# Patient Record
Sex: MALE | Race: WHITE | NOT HISPANIC OR LATINO | Employment: FULL TIME | URBAN - NONMETROPOLITAN AREA
[De-identification: names, ages, dates, MRNs, and addresses within clinical notes are randomized per-mention and may not be internally consistent; named-entity substitution may affect disease eponyms.]

---
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
## 2022-08-04 ENCOUNTER — APPOINTMENT (OUTPATIENT)
Dept: CT IMAGING | Facility: HOSPITAL | Age: 22
End: 2022-08-04
Attending: PHYSICIAN ASSISTANT
Payer: COMMERCIAL

## 2022-08-04 ENCOUNTER — HOSPITAL ENCOUNTER (EMERGENCY)
Facility: HOSPITAL | Age: 22
Discharge: ANOTHER HOSPITAL | End: 2022-08-04
Attending: PHYSICIAN ASSISTANT | Admitting: PHYSICIAN ASSISTANT
Payer: COMMERCIAL

## 2022-08-04 VITALS
DIASTOLIC BLOOD PRESSURE: 113 MMHG | TEMPERATURE: 99.2 F | RESPIRATION RATE: 18 BRPM | HEART RATE: 115 BPM | WEIGHT: 139.4 LBS | OXYGEN SATURATION: 100 % | SYSTOLIC BLOOD PRESSURE: 163 MMHG

## 2022-08-04 DIAGNOSIS — E87.29 HIGH ANION GAP METABOLIC ACIDOSIS: ICD-10-CM

## 2022-08-04 DIAGNOSIS — E83.39 HYPERPHOSPHATEMIA: ICD-10-CM

## 2022-08-04 DIAGNOSIS — D64.9 ANEMIA, UNSPECIFIED TYPE: ICD-10-CM

## 2022-08-04 DIAGNOSIS — E87.6 HYPOKALEMIA: ICD-10-CM

## 2022-08-04 DIAGNOSIS — D64.9 ANEMIA: ICD-10-CM

## 2022-08-04 DIAGNOSIS — Q61.3 POLYCYSTIC KIDNEY DISEASE: ICD-10-CM

## 2022-08-04 DIAGNOSIS — N19 RENAL FAILURE: ICD-10-CM

## 2022-08-04 LAB
ABO/RH(D): NORMAL
ALBUMIN SERPL-MCNC: 4 G/DL (ref 3.4–5)
ALBUMIN UR-MCNC: 100 MG/DL
ALP SERPL-CCNC: 97 U/L (ref 65–260)
ALT SERPL W P-5'-P-CCNC: 6 U/L (ref 0–50)
ANION GAP SERPL CALCULATED.3IONS-SCNC: 23 MMOL/L (ref 3–14)
ANTIBODY SCREEN: NEGATIVE
APPEARANCE UR: CLEAR
AST SERPL W P-5'-P-CCNC: <3 U/L (ref 0–35)
BASOPHILS # BLD AUTO: 0.1 10E3/UL (ref 0–0.2)
BASOPHILS NFR BLD AUTO: 0 %
BILIRUB SERPL-MCNC: 0.4 MG/DL (ref 0.2–1.3)
BILIRUB UR QL STRIP: NEGATIVE
BUN SERPL-MCNC: 139 MG/DL (ref 7–30)
CALCIUM SERPL-MCNC: 9 MG/DL (ref 8.5–10.1)
CHLORIDE BLD-SCNC: 97 MMOL/L (ref 98–110)
CO2 SERPL-SCNC: 16 MMOL/L (ref 20–32)
COLOR UR AUTO: ABNORMAL
CREAT SERPL-MCNC: 18.7 MG/DL (ref 0.5–1)
CRP SERPL-MCNC: 36.6 MG/L (ref 0–8)
EOSINOPHIL # BLD AUTO: 0 10E3/UL (ref 0–0.7)
EOSINOPHIL NFR BLD AUTO: 0 %
ERYTHROCYTE [DISTWIDTH] IN BLOOD BY AUTOMATED COUNT: 20.2 % (ref 10–15)
GFR SERPL CREATININE-BSD FRML MDRD: 3 ML/MIN/1.73M2
GLUCOSE BLD-MCNC: 261 MG/DL (ref 70–99)
GLUCOSE UR STRIP-MCNC: NEGATIVE MG/DL
HCT VFR BLD AUTO: 23.3 % (ref 40–53)
HGB BLD-MCNC: 6.9 G/DL (ref 13.3–17.7)
HGB UR QL STRIP: ABNORMAL
IMM GRANULOCYTES # BLD: 0 10E3/UL
IMM GRANULOCYTES NFR BLD: 0 %
INR PPP: 1.23 (ref 0.85–1.15)
KETONES UR STRIP-MCNC: NEGATIVE MG/DL
LACTATE SERPL-SCNC: 2.1 MMOL/L (ref 0.7–2)
LEUKOCYTE ESTERASE UR QL STRIP: ABNORMAL
LIPASE SERPL-CCNC: 251 U/L (ref 73–393)
LYMPHOCYTES # BLD AUTO: 0.8 10E3/UL (ref 0.8–5.3)
LYMPHOCYTES NFR BLD AUTO: 5 %
MAGNESIUM SERPL-MCNC: 2.3 MG/DL (ref 1.6–2.3)
MCH RBC QN AUTO: 17.9 PG (ref 26.5–33)
MCHC RBC AUTO-ENTMCNC: 29.6 G/DL (ref 31.5–36.5)
MCV RBC AUTO: 61 FL (ref 78–100)
MONOCYTES # BLD AUTO: 0.8 10E3/UL (ref 0–1.3)
MONOCYTES NFR BLD AUTO: 5 %
NEUTROPHILS # BLD AUTO: 12.2 10E3/UL (ref 1.6–8.3)
NEUTROPHILS NFR BLD AUTO: 90 %
NITRATE UR QL: NEGATIVE
NRBC # BLD AUTO: 0 10E3/UL
NRBC BLD AUTO-RTO: 0 /100
PH UR STRIP: 6 [PH] (ref 4.7–8)
PHOSPHATE SERPL-MCNC: 11.2 MG/DL (ref 2.5–4.5)
PLATELET # BLD AUTO: 458 10E3/UL (ref 150–450)
POTASSIUM BLD-SCNC: 2.5 MMOL/L (ref 3.4–5.3)
PROT SERPL-MCNC: 8.9 G/DL (ref 6.8–8.8)
RBC # BLD AUTO: 3.85 10E6/UL (ref 4.4–5.9)
RBC URINE: 17 /HPF
SARS-COV-2 RNA RESP QL NAA+PROBE: NEGATIVE
SODIUM SERPL-SCNC: 136 MMOL/L (ref 133–144)
SP GR UR STRIP: 1.01 (ref 1–1.03)
SPECIMEN EXPIRATION DATE: NORMAL
SQUAMOUS EPITHELIAL: 0 /HPF
TSH SERPL DL<=0.005 MIU/L-ACNC: 1.01 MU/L (ref 0.4–4)
UROBILINOGEN UR STRIP-MCNC: NORMAL MG/DL
WBC # BLD AUTO: 13.8 10E3/UL (ref 4–11)
WBC URINE: 7 /HPF

## 2022-08-04 PROCEDURE — 84100 ASSAY OF PHOSPHORUS: CPT | Performed by: PHYSICIAN ASSISTANT

## 2022-08-04 PROCEDURE — 93010 ELECTROCARDIOGRAM REPORT: CPT | Performed by: INTERNAL MEDICINE

## 2022-08-04 PROCEDURE — 81001 URINALYSIS AUTO W/SCOPE: CPT | Performed by: PHYSICIAN ASSISTANT

## 2022-08-04 PROCEDURE — 83735 ASSAY OF MAGNESIUM: CPT | Performed by: PHYSICIAN ASSISTANT

## 2022-08-04 PROCEDURE — 86850 RBC ANTIBODY SCREEN: CPT | Performed by: PHYSICIAN ASSISTANT

## 2022-08-04 PROCEDURE — 99285 EMERGENCY DEPT VISIT HI MDM: CPT | Mod: 25

## 2022-08-04 PROCEDURE — U0005 INFEC AGEN DETEC AMPLI PROBE: HCPCS | Performed by: PHYSICIAN ASSISTANT

## 2022-08-04 PROCEDURE — 84443 ASSAY THYROID STIM HORMONE: CPT | Performed by: PHYSICIAN ASSISTANT

## 2022-08-04 PROCEDURE — 83605 ASSAY OF LACTIC ACID: CPT | Performed by: PHYSICIAN ASSISTANT

## 2022-08-04 PROCEDURE — C9803 HOPD COVID-19 SPEC COLLECT: HCPCS

## 2022-08-04 PROCEDURE — 82040 ASSAY OF SERUM ALBUMIN: CPT | Performed by: PHYSICIAN ASSISTANT

## 2022-08-04 PROCEDURE — 87086 URINE CULTURE/COLONY COUNT: CPT | Performed by: PHYSICIAN ASSISTANT

## 2022-08-04 PROCEDURE — 85004 AUTOMATED DIFF WBC COUNT: CPT | Performed by: PHYSICIAN ASSISTANT

## 2022-08-04 PROCEDURE — 80053 COMPREHEN METABOLIC PANEL: CPT | Performed by: PHYSICIAN ASSISTANT

## 2022-08-04 PROCEDURE — 86901 BLOOD TYPING SEROLOGIC RH(D): CPT | Performed by: PHYSICIAN ASSISTANT

## 2022-08-04 PROCEDURE — 96365 THER/PROPH/DIAG IV INF INIT: CPT

## 2022-08-04 PROCEDURE — 36415 COLL VENOUS BLD VENIPUNCTURE: CPT | Performed by: PHYSICIAN ASSISTANT

## 2022-08-04 PROCEDURE — 85610 PROTHROMBIN TIME: CPT | Performed by: PHYSICIAN ASSISTANT

## 2022-08-04 PROCEDURE — 86140 C-REACTIVE PROTEIN: CPT | Performed by: PHYSICIAN ASSISTANT

## 2022-08-04 PROCEDURE — 250N000013 HC RX MED GY IP 250 OP 250 PS 637: Performed by: PHYSICIAN ASSISTANT

## 2022-08-04 PROCEDURE — 93005 ELECTROCARDIOGRAM TRACING: CPT

## 2022-08-04 PROCEDURE — 71250 CT THORAX DX C-: CPT

## 2022-08-04 PROCEDURE — 83690 ASSAY OF LIPASE: CPT | Performed by: PHYSICIAN ASSISTANT

## 2022-08-04 PROCEDURE — 99284 EMERGENCY DEPT VISIT MOD MDM: CPT | Performed by: PHYSICIAN ASSISTANT

## 2022-08-04 PROCEDURE — 250N000011 HC RX IP 250 OP 636: Performed by: PHYSICIAN ASSISTANT

## 2022-08-04 RX ORDER — POTASSIUM CHLORIDE 7.45 MG/ML
10 INJECTION INTRAVENOUS CONTINUOUS
Status: DISCONTINUED | OUTPATIENT
Start: 2022-08-04 | End: 2022-08-04 | Stop reason: HOSPADM

## 2022-08-04 RX ORDER — POTASSIUM CHLORIDE 20MEQ/15ML
40 LIQUID (ML) ORAL ONCE
Status: COMPLETED | OUTPATIENT
Start: 2022-08-04 | End: 2022-08-04

## 2022-08-04 RX ORDER — IOPAMIDOL 755 MG/ML
70 INJECTION, SOLUTION INTRAVASCULAR ONCE
Status: DISCONTINUED | OUTPATIENT
Start: 2022-08-04 | End: 2022-08-04

## 2022-08-04 RX ADMIN — POTASSIUM CHLORIDE 10 MEQ: 7.46 INJECTION, SOLUTION INTRAVENOUS at 18:02

## 2022-08-04 RX ADMIN — POTASSIUM CHLORIDE 40 MEQ: 20 SOLUTION ORAL at 16:21

## 2022-08-04 NOTE — ED TRIAGE NOTES
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 He is grimacing at times and is walking as if he is bow-legged which Mom states is not normal for him. He appears pale to this writer. Hypertension noted. Mom states his last weigh in was 185 lb and today he is 139.4 lb. He has not been eating well according to Mom and he just doesn't seem quite right. Primary Doctoring is at [facility].      Triage Assessment     Row Name 08/04/22 3857       Triage Assessment (Adult)    Airway WDL WDL       Respiratory WDL    Respiratory WDL WDL       Skin Circulation/Temperature WDL    Skin Circulation/Temperature WDL WDL       Cardiac WDL    Cardiac WDL WDL       Peripheral/Neurovascular WDL    Peripheral Neurovascular WDL WDL       Cognitive/Neuro/Behavioral WDL    Cognitive/Neuro/Behavioral WDL X;all  normal for this patient              "

## 2022-08-04 NOTE — DISCHARGE INSTRUCTIONS

## 2022-08-04 NOTE — ED NOTES
Urine sample cup provided. Pt and mom aware we would like a urine sample. Pt denies need to urinate at this time.

## 2022-08-04 NOTE — ED PROVIDER NOTES
"  History     Chief Complaint   Patient presents with     Leg Pain     Patient states \"legs hurt\"     The history is provided by a parent.     Robert Alarcon is a 21 year old male who presented to the emergency department ambulatory along with mother for evaluation of a multitude of mother's concerns.  The patient is on the autism spectrum.  He offers no significant or appreciable HPI.  From what I can gather the patient has been complaining of bilateral leg discomfort and mother reports that his gait has \"changed.\"  He is also lost approximately 40 pounds over the last several months.  Unfortunately has no primary care.    Allergies:  No Known Allergies    Problem List:    There are no problems to display for this patient.       Past Medical History:    No past medical history on file.    Past Surgical History:    No past surgical history on file.    Family History:    No family history on file.    Social History:  Marital Status:  Single [1]        Medications:    No current outpatient medications on file.        Review of Systems   Unable to perform ROS: Patient nonverbal       Physical Exam   BP: (!) 186/126  Pulse: (!) 124  Temp: 99.2  F (37.3  C)  Resp: 20  Weight: 63.2 kg (139 lb 6.4 oz)  SpO2: 100 %      Physical Exam  Vitals and nursing note reviewed.   Constitutional:       Comments: This young man appears chronically ill but nontoxic.   Cardiovascular:      Rate and Rhythm: Regular rhythm.      Comments: Mild tachycardia  Pulmonary:      Effort: Pulmonary effort is normal.      Breath sounds: Normal breath sounds.   Abdominal:      Comments: Palpation of the abdomen appears to have multiple masses.   Musculoskeletal:      Comments: Examination of the bilateral legs reveals no edema, erythema, or evidence of tenderness.   Skin:     General: Skin is warm and dry.      Capillary Refill: Capillary refill takes less than 2 seconds.   Neurological:      General: No focal deficit present.      Mental Status: He " is alert. Mental status is at baseline.      Comments: At baseline per mother         ED Course              ED Course as of 08/04/22 1941 Thu Aug 04, 2022   1758 Discussed with Dr. Salas at [facility] in [facility].  Plan will be for weightlifting the patient and potassium repletion with eventual bicarbonate replacement for the metabolic acidosis.   1758 We are calling around to tertiary centers for possible admission.     Procedures              Critical Care time:  none              Results for orders placed or performed during the hospital encounter of 08/04/22 (from the past 24 hour(s))   CBC with platelets differential    Narrative    The following orders were created for panel order CBC with platelets differential.  Procedure                               Abnormality         Status                     ---------                               -----------         ------                     CBC with platelets and d...[[id number]]  Abnormal            Final result                 Please view results for these tests on the individual orders.   INR   Result Value Ref Range    INR 1.23 (H) 0.85 - 1.15   Comprehensive metabolic panel   Result Value Ref Range    Sodium 136 133 - 144 mmol/L    Potassium 2.5 (LL) 3.4 - 5.3 mmol/L    Chloride 97 (L) 98 - 110 mmol/L    Carbon Dioxide (CO2) 16 (L) 20 - 32 mmol/L    Anion Gap 23 (H) 3 - 14 mmol/L    Urea Nitrogen 139 (H) 7 - 30 mg/dL    Creatinine 18.70 (H) 0.50 - 1.00 mg/dL    Calcium 9.0 8.5 - 10.1 mg/dL    Glucose 261 (H) 70 - 99 mg/dL    Alkaline Phosphatase 97 65 - 260 U/L    AST <3 0 - 35 U/L    ALT 6 0 - 50 U/L    Protein Total 8.9 (H) 6.8 - 8.8 g/dL    Albumin 4.0 3.4 - 5.0 g/dL    Bilirubin Total 0.4 0.2 - 1.3 mg/dL    GFR Estimate 3 (L) >60 mL/min/1.73m2   Lipase   Result Value Ref Range    Lipase 251 73 - 393 U/L   Lactic acid whole blood   Result Value Ref Range    Lactic Acid 2.1 (H) 0.7 - 2.0 mmol/L   Magnesium   Result Value Ref Range     Magnesium 2.3 1.6 - 2.3 mg/dL   TSH with free T4 reflex   Result Value Ref Range    TSH 1.01 0.40 - 4.00 mU/L   CRP inflammation   Result Value Ref Range    CRP Inflammation 36.6 (H) 0.0 - 8.0 mg/L   CBC with platelets and differential   Result Value Ref Range    WBC Count 13.8 (H) 4.0 - 11.0 10e3/uL    RBC Count 3.85 (L) 4.40 - 5.90 10e6/uL    Hemoglobin 6.9 (LL) 13.3 - 17.7 g/dL    Hematocrit 23.3 (L) 40.0 - 53.0 %    MCV 61 (L) 78 - 100 fL    MCH 17.9 (L) 26.5 - 33.0 pg    MCHC 29.6 (L) 31.5 - 36.5 g/dL    RDW 20.2 (H) 10.0 - 15.0 %    Platelet Count 458 (H) 150 - 450 10e3/uL    % Neutrophils 90 %    % Lymphocytes 5 %    % Monocytes 5 %    % Eosinophils 0 %    % Basophils 0 %    % Immature Granulocytes 0 %    NRBCs per 100 WBC 0 <1 /100    Absolute Neutrophils 12.2 (H) 1.6 - 8.3 10e3/uL    Absolute Lymphocytes 0.8 0.8 - 5.3 10e3/uL    Absolute Monocytes 0.8 0.0 - 1.3 10e3/uL    Absolute Eosinophils 0.0 0.0 - 0.7 10e3/uL    Absolute Basophils 0.1 0.0 - 0.2 10e3/uL    Absolute Immature Granulocytes 0.0 <=0.4 10e3/uL    Absolute NRBCs 0.0 10e3/uL   Phosphorus   Result Value Ref Range    Phosphorus 11.2 (H) 2.5 - 4.5 mg/dL   ABO/Rh type and screen *Canceled*    Narrative    The following orders were created for panel order ABO/Rh type and screen.  Procedure                               Abnormality         Status                     ---------                               -----------         ------                       Please view results for these tests on the individual orders.   CT Chest Abdomen Pelvis w/o Contrast    Narrative    PROCEDURE:  CT CHEST ABDOMEN PELVIS W/O CONTRAST.      HISTORY:  Profound weight loss, renal failure, anemia.      TECHNIQUE:  Helical CT of the chest, abdomen and pelvis was performed  without intravenous contrast.    COMPARISON:  8/22/2019.    FINDINGS:      The left ventricle is dilated. The right ventricle is hypoplastic.  There is no pericardial or pleural effusion. The thoracic  aorta and  main pulmonary artery are within normal limits in size. No abnormal  thoracic adenopathy is identified.    The central airways are patent. There is no focal consolidation or  significant atelectasis.  No pulmonary mass is identified.    The kidneys are enlarged secondary to advanced polycystic kidney  disease. Assessment for a suspicious renal cortical mass is limited  absent postcontrast sequences. There is some associated distortion of  the small bowel in the upper abdomen, which remains normal in caliber.  The appendix is normal.    No free fluid, free air or adenopathy is present. No suspicious  osseous lesions are identified.      Impression    IMPRESSION:      Dilated left ventricle. Hypoplastic right ventricle. Correlate with  any cardiology history. Consider echocardiogram.    Severe polycystic kidney disease with progressive intra-abdominal mass  effect.    KARLENE PLASCENCIA MD         SYSTEM ID:  [id number]   ABO/Rh type and screen    Narrative    The following orders were created for panel order ABO/Rh type and screen.  Procedure                               Abnormality         Status                     ---------                               -----------         ------                     Adult Type and Screen[[id number]]                            Edited Result - FINAL        Please view results for these tests on the individual orders.   Adult Type and Screen   Result Value Ref Range    ABO/RH(D) AB POS     Antibody Screen Negative Negative    SPECIMEN EXPIRATION DATE [id number]    Asymptomatic COVID-19 Virus (Coronavirus) by PCR Nasopharyngeal    Specimen: Nasopharyngeal; Swab   Result Value Ref Range    SARS CoV2 PCR Negative Negative    Narrative    Testing was performed using the Xpert Xpress SARS-CoV-2 Assay on the   Cepheid Gene-Xpert Instrument Systems. Additional information about   this Emergency Use Authorization (EUA) assay can be found via the Lab   Guide. This test should be  ordered for the detection of SARS-CoV-2 in   individuals who meet SARS-CoV-2 clinical and/or epidemiological   criteria. Test performance is unknown in asymptomatic patients. This   test is for in vitro diagnostic use under the FDA EUA for   laboratories certified under CLIA to perform high complexity testing.   This test has not been FDA cleared or approved. A negative result   does not rule out the presence of PCR inhibitors in the specimen or   target RNA in concentration below the limit of detection for the   assay. The possibility of a false negative should be considered if   the patient's recent exposure or clinical presentation suggests   COVID-19. This test was validated by [facility]. This laboratory is certified under the Clinical Laboratory Improve  ment Amendments (CLIA) as qualified to perform high complexity testing.   UA with Microscopic reflex to Culture    Specimen: Urine, Midstream   Result Value Ref Range    Color Urine Light Yellow Colorless, Straw, Light Yellow, Yellow    Appearance Urine Clear Clear    Glucose Urine Negative Negative mg/dL    Bilirubin Urine Negative Negative    Ketones Urine Negative Negative mg/dL    Specific Gravity Urine 1.011 1.003 - 1.035    Blood Urine Small (A) Negative    pH Urine 6.0 4.7 - 8.0    Protein Albumin Urine 100  (A) Negative mg/dL    Urobilinogen Urine Normal Normal, 2.0 mg/dL    Nitrite Urine Negative Negative    Leukocyte Esterase Urine Moderate (A) Negative    RBC Urine 17 (H) <=2 /HPF    WBC Urine 7 (H) <=5 /HPF    Squamous Epithelials Urine 0 <=1 /HPF    Narrative    Urine Culture ordered based on laboratory criteria       Medications   potassium chloride 10 mEq in 100 mL sterile water intermittent infusion (premix) (0 mEq Intravenous Stopped 8/4/22 1902)   potassium chloride (KAYCIEL) solution 40 mEq (40 mEq Oral Given 8/4/22 1621)       Assessments & Plan (with Medical Decision Making)   Findings as above.  This is a  21-year-old male with what sounds to be a past history of polycystic kidney disease with apparent advancement to the level of complete renal failure.  Patient's creatinine and BUN as above.  He is hypokalemic.  Hypophosphatemic.  High anion gap acidosis.  No medications.  Unfortunately he is on the autism spectrum and can offer no significant helpful review of systems or HPI.  Significant recent weight loss.  Obviously requires tertiary center transfer.  Ultimately accepted by [facility] in [place] after discussion with Dr. Ontiveros and nephrologist Dr. Shahid.    This document was prepared using a combination of typing and voice generated software.  While every attempt was made for accuracy, spelling and grammatical errors may exist.    I have reviewed the nursing notes.    I have reviewed the findings, diagnosis, plan and need for follow up with the patient.      New Prescriptions    No medications on file       Final diagnoses:   Renal failure   Polycystic kidney disease   Hypokalemia   High anion gap metabolic acidosis   Hyperphosphatemia   Anemia       8/4/2022   [facility]     Shakeel Whitlock PA-C  08/04/22 1942

## 2022-08-04 NOTE — ED NOTES
[facility]  No beds   Libtayo Counseling- I discussed with the patient the risks of Libtayo including but not limited to nausea, vomiting, diarrhea, and bone or muscle pain.  The patient verbalized understanding of the proper use and possible adverse effects of Libtayo.  All of the patient's questions and concerns were addressed.

## 2022-08-05 LAB — EJECTION FRACTION: 28 %

## 2022-08-06 LAB — BACTERIA UR CULT: NORMAL

## 2022-08-10 ENCOUNTER — APPOINTMENT (OUTPATIENT)
Dept: GENERAL RADIOLOGY | Facility: HOSPITAL | Age: 22
End: 2022-08-10
Attending: EMERGENCY MEDICINE
Payer: COMMERCIAL

## 2022-08-10 ENCOUNTER — HOSPITAL ENCOUNTER (EMERGENCY)
Facility: HOSPITAL | Age: 22
Discharge: HOME | End: 2022-08-11
Attending: EMERGENCY MEDICINE | Admitting: EMERGENCY MEDICINE
Payer: COMMERCIAL

## 2022-08-10 DIAGNOSIS — N18.6 END STAGE RENAL DISEASE (H): ICD-10-CM

## 2022-08-10 DIAGNOSIS — Z45.2 ENCOUNTER FOR REMOVAL OF VASCULAR CATHETER: ICD-10-CM

## 2022-08-10 PROCEDURE — 71046 X-RAY EXAM CHEST 2 VIEWS: CPT

## 2022-08-10 PROCEDURE — 99284 EMERGENCY DEPT VISIT MOD MDM: CPT | Mod: 25

## 2022-08-10 PROCEDURE — 99283 EMERGENCY DEPT VISIT LOW MDM: CPT | Performed by: EMERGENCY MEDICINE

## 2022-08-11 VITALS
OXYGEN SATURATION: 99 % | TEMPERATURE: 98.9 F | DIASTOLIC BLOOD PRESSURE: 73 MMHG | SYSTOLIC BLOOD PRESSURE: 114 MMHG | HEART RATE: 87 BPM | RESPIRATION RATE: 16 BRPM

## 2022-08-11 LAB
ANION GAP SERPL CALCULATED.3IONS-SCNC: 12 MMOL/L (ref 3–14)
BUN SERPL-MCNC: 45 MG/DL (ref 7–30)
CALCIUM SERPL-MCNC: 9.3 MG/DL (ref 8.5–10.1)
CHLORIDE BLD-SCNC: 98 MMOL/L (ref 98–110)
CO2 SERPL-SCNC: 25 MMOL/L (ref 20–32)
CREAT SERPL-MCNC: 11 MG/DL (ref 0.5–1)
GFR SERPL CREATININE-BSD FRML MDRD: 6 ML/MIN/1.73M2
GLUCOSE BLD-MCNC: 99 MG/DL (ref 70–99)
POTASSIUM BLD-SCNC: 3.8 MMOL/L (ref 3.4–5.3)
SODIUM SERPL-SCNC: 135 MMOL/L (ref 133–144)

## 2022-08-11 PROCEDURE — 80048 BASIC METABOLIC PNL TOTAL CA: CPT | Performed by: EMERGENCY MEDICINE

## 2022-08-11 PROCEDURE — 36415 COLL VENOUS BLD VENIPUNCTURE: CPT | Performed by: EMERGENCY MEDICINE

## 2022-08-11 NOTE — ED NOTES
Pt has open wound in the right upper chest where dialysis catheter had been removed by patient prior to arrival. Area covered by 4x4s and tape.

## 2022-08-11 NOTE — ED NOTES
Wound covered with 2x2 and tegaderm. Dialysis catheter viewed by MD and RN. Dialysis catheter appears to be fully intact.

## 2022-08-11 NOTE — ED PROVIDER NOTES
History     Chief Complaint   Patient presents with     Vascular Access Problem     HPI  Robert Alarcon is a 21 year old male with significant underlying kidney disease scheduled to start dialysis tomorrow, pulled his right chest Steve catheter out tonight, no chest pain or shortness of breath.  No ongoing bleeding.  No other specific concerns per mother    Allergies:  No Known Allergies    Problem List: Autism spectrum  There are no problems to display for this patient.       Past Medical History: Renal disease  No past medical history on file.    Past Surgical History:    No past surgical history on file.    Family History:    No family history on file.    Social History: Presents with mother  Marital Status:  Single [1]        Medications:    No current outpatient medications on file.        Review of Systems   Unobtainable though the patient does not appear to be in distress.  Autism    Physical Exam   BP: 114/73  Pulse: 90  Temp: 98.9  F (37.2  C)  Resp: 16  SpO2: 100 %      Physical Exam  HENT:      Head: Normocephalic.      Nose: Nose normal.   Eyes:      Conjunctiva/sclera: Conjunctivae normal.   Cardiovascular:      Rate and Rhythm: Normal rate.      Pulses: Normal pulses.   Pulmonary:      Effort: Pulmonary effort is normal. No respiratory distress.      Breath sounds: No wheezing.   Abdominal:      Palpations: Abdomen is soft.   Musculoskeletal:         General: No swelling or tenderness. Normal range of motion.      Cervical back: Normal range of motion and neck supple.   Skin:     Capillary Refill: Capillary refill takes less than 2 seconds.      Comments: Right chest with wound from removal of Steve, good hemostasis, no subcu air, patient breathing normally, lung sounds clear   Neurological:      General: No focal deficit present.      Mental Status: He is alert.   Psychiatric:         Mood and Affect: Mood normal.     Chest x-ray read in real-time by the emergency physician shows no evidence  for pneumothorax or hemothorax.  Formal reading pending       Results for orders placed or performed during the hospital encounter of 08/10/22 (from the past 24 hour(s))   Basic metabolic panel   Result Value Ref Range    Sodium 135 133 - 144 mmol/L    Potassium 3.8 3.4 - 5.3 mmol/L    Chloride 98 98 - 110 mmol/L    Carbon Dioxide (CO2)      Anion Gap      Urea Nitrogen      Creatinine      Calcium      Glucose      GFR Estimate         Medications - No data to display    Assessments & Plan (with Medical Decision Making)   Patient presenting after spontaneously removing his Steve catheter.  He does have autism and was scheduled to go for dialysis tomorrow.  No other complaints.  Chest x-ray reassuring.  Wound without air or hemorrhage.  Dressing placed.  Plan to contact nephrology and primary care tomorrow regarding ongoing plan of care.  Potassium 3.8 this evening      There are no discharge medications for this patient.      Final diagnoses:   Encounter for removal of vascular catheter   End stage renal disease (H)       8/10/2022   [facility]     Fred Elizondo MD  08/11/22 0045

## 2022-08-11 NOTE — ED TRIAGE NOTES
Dialysis port came out while in bed  Pt has dialysis suppose to start tomorrow  Last run was on Tuesday. Recently had port placed like 3 days ago.    This is the 2nd port that's been pulled out    Just got out of hospital yesterday

## 2022-09-24 ENCOUNTER — MEDICAL CORRESPONDENCE (OUTPATIENT)
Dept: HEALTH INFORMATION MANAGEMENT | Facility: CLINIC | Age: 22
End: 2022-09-24

## 2023-02-08 LAB — EJECTION FRACTION: NORMAL %

## 2023-05-07 ENCOUNTER — HOSPITAL ENCOUNTER (EMERGENCY)
Facility: HOSPITAL | Age: 23
Discharge: ANOTHER HOSPITAL | End: 2023-05-07
Attending: NURSE PRACTITIONER | Admitting: NURSE PRACTITIONER
Payer: COMMERCIAL

## 2023-05-07 ENCOUNTER — APPOINTMENT (OUTPATIENT)
Dept: CT IMAGING | Facility: HOSPITAL | Age: 23
End: 2023-05-07
Attending: NURSE PRACTITIONER
Payer: COMMERCIAL

## 2023-05-07 VITALS
SYSTOLIC BLOOD PRESSURE: 98 MMHG | WEIGHT: 174.82 LBS | HEART RATE: 92 BPM | RESPIRATION RATE: 26 BRPM | DIASTOLIC BLOOD PRESSURE: 60 MMHG | OXYGEN SATURATION: 100 % | TEMPERATURE: 98.3 F

## 2023-05-07 DIAGNOSIS — A41.9 SEPTIC SHOCK (H): ICD-10-CM

## 2023-05-07 DIAGNOSIS — R65.21 SEPTIC SHOCK (H): ICD-10-CM

## 2023-05-07 PROBLEM — Q61.3 POLYCYSTIC RENAL DISEASE: Status: ACTIVE | Noted: 2022-08-05

## 2023-05-07 PROBLEM — N18.9 ANEMIA IN CHRONIC KIDNEY DISEASE: Status: ACTIVE | Noted: 2023-03-13

## 2023-05-07 PROBLEM — D50.8 IRON DEFICIENCY ANEMIA SECONDARY TO INADEQUATE DIETARY IRON INTAKE: Status: ACTIVE | Noted: 2022-08-05

## 2023-05-07 PROBLEM — N25.81 SECONDARY HYPERPARATHYROIDISM OF RENAL ORIGIN (H): Status: ACTIVE | Noted: 2022-08-05

## 2023-05-07 PROBLEM — N18.6 ESRD (END STAGE RENAL DISEASE) (H): Status: ACTIVE | Noted: 2022-11-10

## 2023-05-07 PROBLEM — D63.1 ANEMIA IN CHRONIC KIDNEY DISEASE: Status: ACTIVE | Noted: 2023-03-13

## 2023-05-07 PROBLEM — I51.7 LEFT VENTRICULAR DILATION: Status: ACTIVE | Noted: 2022-08-05

## 2023-05-07 LAB
ALBUMIN SERPL BCG-MCNC: 4.9 G/DL (ref 3.5–5.2)
ALP SERPL-CCNC: 180 U/L (ref 40–129)
ALT SERPL W P-5'-P-CCNC: 52 U/L (ref 10–50)
ANION GAP SERPL CALCULATED.3IONS-SCNC: 23 MMOL/L (ref 7–15)
AST SERPL W P-5'-P-CCNC: 43 U/L (ref 10–50)
BASOPHILS # BLD AUTO: 0.2 10E3/UL (ref 0–0.2)
BASOPHILS NFR BLD AUTO: 1 %
BILIRUB SERPL-MCNC: 0.6 MG/DL
BUN SERPL-MCNC: 47.2 MG/DL (ref 6–20)
CALCIUM SERPL-MCNC: 10.7 MG/DL (ref 8.6–10)
CHLORIDE SERPL-SCNC: 84 MMOL/L (ref 98–107)
CREAT SERPL-MCNC: 15.25 MG/DL (ref 0.67–1.17)
DEPRECATED HCO3 PLAS-SCNC: 28 MMOL/L (ref 22–29)
EOSINOPHIL # BLD AUTO: 0 10E3/UL (ref 0–0.7)
EOSINOPHIL NFR BLD AUTO: 0 %
ERYTHROCYTE [DISTWIDTH] IN BLOOD BY AUTOMATED COUNT: 17.5 % (ref 10–15)
GFR SERPL CREATININE-BSD FRML MDRD: 4 ML/MIN/1.73M2
GLUCOSE SERPL-MCNC: 178 MG/DL (ref 70–99)
HCT VFR BLD AUTO: 57 % (ref 40–53)
HGB BLD-MCNC: 18.1 G/DL (ref 13.3–17.7)
HOLD SPECIMEN: NORMAL
IMM GRANULOCYTES # BLD: 0.1 10E3/UL
IMM GRANULOCYTES NFR BLD: 0 %
LACTATE SERPL-SCNC: 4.1 MMOL/L (ref 0.7–2)
LIPASE SERPL-CCNC: 18 U/L (ref 13–60)
LYMPHOCYTES # BLD AUTO: 2.2 10E3/UL (ref 0.8–5.3)
LYMPHOCYTES NFR BLD AUTO: 15 %
MCH RBC QN AUTO: 26.7 PG (ref 26.5–33)
MCHC RBC AUTO-ENTMCNC: 31.8 G/DL (ref 31.5–36.5)
MCV RBC AUTO: 84 FL (ref 78–100)
MONOCYTES # BLD AUTO: 1.1 10E3/UL (ref 0–1.3)
MONOCYTES NFR BLD AUTO: 7 %
NEUTROPHILS # BLD AUTO: 11.3 10E3/UL (ref 1.6–8.3)
NEUTROPHILS NFR BLD AUTO: 77 %
NRBC # BLD AUTO: 0 10E3/UL
NRBC BLD AUTO-RTO: 0 /100
PLATELET # BLD AUTO: 510 10E3/UL (ref 150–450)
POTASSIUM SERPL-SCNC: 3.2 MMOL/L (ref 3.4–5.3)
PROCALCITONIN SERPL IA-MCNC: 1.19 NG/ML
PROT SERPL-MCNC: 9.7 G/DL (ref 6.4–8.3)
RBC # BLD AUTO: 6.79 10E6/UL (ref 4.4–5.9)
SODIUM SERPL-SCNC: 135 MMOL/L (ref 136–145)
WBC # BLD AUTO: 14.8 10E3/UL (ref 4–11)

## 2023-05-07 PROCEDURE — 250N000011 HC RX IP 250 OP 636: Performed by: NURSE PRACTITIONER

## 2023-05-07 PROCEDURE — 80053 COMPREHEN METABOLIC PANEL: CPT | Performed by: NURSE PRACTITIONER

## 2023-05-07 PROCEDURE — 87205 SMEAR GRAM STAIN: CPT | Performed by: NURSE PRACTITIONER

## 2023-05-07 PROCEDURE — 85025 COMPLETE CBC W/AUTO DIFF WBC: CPT | Performed by: NURSE PRACTITIONER

## 2023-05-07 PROCEDURE — 258N000003 HC RX IP 258 OP 636: Performed by: NURSE PRACTITIONER

## 2023-05-07 PROCEDURE — 83605 ASSAY OF LACTIC ACID: CPT | Performed by: NURSE PRACTITIONER

## 2023-05-07 PROCEDURE — 96361 HYDRATE IV INFUSION ADD-ON: CPT

## 2023-05-07 PROCEDURE — 250N000009 HC RX 250: Performed by: NURSE PRACTITIONER

## 2023-05-07 PROCEDURE — 99285 EMERGENCY DEPT VISIT HI MDM: CPT | Mod: 25

## 2023-05-07 PROCEDURE — 84145 PROCALCITONIN (PCT): CPT | Performed by: NURSE PRACTITIONER

## 2023-05-07 PROCEDURE — 96375 TX/PRO/DX INJ NEW DRUG ADDON: CPT

## 2023-05-07 PROCEDURE — 96365 THER/PROPH/DIAG IV INF INIT: CPT

## 2023-05-07 PROCEDURE — 36415 COLL VENOUS BLD VENIPUNCTURE: CPT | Performed by: NURSE PRACTITIONER

## 2023-05-07 PROCEDURE — 99285 EMERGENCY DEPT VISIT HI MDM: CPT | Performed by: NURSE PRACTITIONER

## 2023-05-07 PROCEDURE — 87040 BLOOD CULTURE FOR BACTERIA: CPT | Performed by: NURSE PRACTITIONER

## 2023-05-07 PROCEDURE — 71250 CT THORAX DX C-: CPT

## 2023-05-07 PROCEDURE — 83690 ASSAY OF LIPASE: CPT | Performed by: NURSE PRACTITIONER

## 2023-05-07 RX ORDER — CINACALCET 30 MG/1
1 TABLET, FILM COATED ORAL
COMMUNITY
Start: 2023-04-13

## 2023-05-07 RX ORDER — METOPROLOL SUCCINATE 50 MG/1
TABLET, EXTENDED RELEASE ORAL
COMMUNITY
Start: 2023-03-28

## 2023-05-07 RX ORDER — ASCORBIC ACID, THIAMINE, RIBOFLAVIN, NIACINAMIDE, PYRIDOXINE, FOLIC ACID, COBALAMIN, BIOTIN, PANTOTHENIC ACID, ZINC 100; 1.5; 1.7; 20; 10; 1; 6; 300; 10; 5 MG/1; MG/1; MG/1; MG/1; MG/1; MG/1; UG/1; UG/1; MG/1; MG/1
1 TABLET, COATED ORAL
COMMUNITY
Start: 2022-09-10

## 2023-05-07 RX ORDER — ROPIVACAINE IN 0.9% SOD CHL/PF 0.1 %
.03-.125 PLASTIC BAG, INJECTION (ML) EPIDURAL CONTINUOUS
Status: DISCONTINUED | OUTPATIENT
Start: 2023-05-07 | End: 2023-05-07 | Stop reason: HOSPADM

## 2023-05-07 RX ORDER — SEVELAMER CARBONATE 800 MG/1
TABLET, FILM COATED ORAL
COMMUNITY
Start: 2023-03-25

## 2023-05-07 RX ORDER — DOCUSATE SODIUM 100 MG/1
CAPSULE, LIQUID FILLED ORAL
COMMUNITY
Start: 2023-04-21

## 2023-05-07 RX ORDER — LOSARTAN POTASSIUM 25 MG/1
0.5 TABLET ORAL
COMMUNITY
Start: 2022-08-10

## 2023-05-07 RX ADMIN — TAZOBACTAM SODIUM AND PIPERACILLIN SODIUM 4.5 G: 500; 4 INJECTION, SOLUTION INTRAVENOUS at 14:50

## 2023-05-07 RX ADMIN — SODIUM CHLORIDE, POTASSIUM CHLORIDE, SODIUM LACTATE AND CALCIUM CHLORIDE 1000 ML: 600; 310; 30; 20 INJECTION, SOLUTION INTRAVENOUS at 13:43

## 2023-05-07 RX ADMIN — PROCHLORPERAZINE EDISYLATE 10 MG: 5 INJECTION INTRAMUSCULAR; INTRAVENOUS at 13:16

## 2023-05-07 RX ADMIN — Medication 0.03 MCG/KG/MIN: at 13:56

## 2023-05-07 RX ADMIN — VANCOMYCIN HYDROCHLORIDE 1750 MG: 5 INJECTION, POWDER, LYOPHILIZED, FOR SOLUTION INTRAVENOUS at 14:48

## 2023-05-07 RX ADMIN — SODIUM CHLORIDE, POTASSIUM CHLORIDE, SODIUM LACTATE AND CALCIUM CHLORIDE 1000 ML: 600; 310; 30; 20 INJECTION, SOLUTION INTRAVENOUS at 12:28

## 2023-05-07 ASSESSMENT — ENCOUNTER SYMPTOMS
ENDOCRINE NEGATIVE: 1
ROS GI COMMENTS: UMBILICAL HERNIA.
NEUROLOGICAL NEGATIVE: 1
CARDIOVASCULAR NEGATIVE: 1
EYES NEGATIVE: 1
HEMATOLOGIC/LYMPHATIC NEGATIVE: 1
MUSCULOSKELETAL NEGATIVE: 1
CONSTITUTIONAL NEGATIVE: 1
ALLERGIC/IMMUNOLOGIC NEGATIVE: 1
ABDOMINAL DISTENTION: 1
VOMITING: 1
RESPIRATORY NEGATIVE: 1

## 2023-05-07 ASSESSMENT — ACTIVITIES OF DAILY LIVING (ADL)
ADLS_ACUITY_SCORE: 35
ADLS_ACUITY_SCORE: 35

## 2023-05-07 NOTE — ED NOTES
Aware  Chronic stable issue  Continue home cholestyramine     Sleeping on cot. Mother at bedside. Remains on monitoring and lying on the left side.

## 2023-05-07 NOTE — ED PROVIDER NOTES
History     Chief Complaint   Patient presents with     Vomiting     HPI   Robert Alarcon is a 22 year old individual with history of autism, end-stage renal disease on peritoneal dialysis 4 times daily, polycystic renal disease with secondary hyperparathyroidism, NOVA, left ventricular dilation, comes in for vomiting.   Mother states patient started vomiting yesterday.  States that abdomen is more distended.  Mother believes patient has a hernia that was first noticed in January but now has becoming more purple and larger.  Patient did eat this morning and did have emesis again today.   Mother is unsure of last bowel movement.  Unsure if patient still makes urine.  Denies any fever, cough.    Allergies:  No Known Allergies    Problem List:    Patient Active Problem List    Diagnosis Date Noted     Anemia in chronic kidney disease 03/13/2023     Priority: Medium     ESRD (end stage renal disease) (H) 11/10/2022     Priority: Medium     Formatting of this note might be different from the original.  Added automatically from request for surgery [id number]       Polycystic renal disease 08/05/2022     Priority: Medium     Secondary hyperparathyroidism of renal origin (H) 08/05/2022     Priority: Medium     Iron deficiency anemia secondary to inadequate dietary iron intake 08/05/2022     Priority: Medium     Left ventricular dilation 08/05/2022     Priority: Medium     Active autistic disorder 08/08/2007     Priority: Medium     Formatting of this note might be different from the original.  IMO Update 10/11          Past Medical History:    No past medical history on file.    Past Surgical History:    No past surgical history on file.    Family History:    No family history on file.    Social History:  Marital Status:  Single [1]        Medications:    B Complex-C-Zn-Folic Acid (DIALYVITE/ZINC) TABS  cinacalcet (SENSIPAR) 30 MG tablet  docusate sodium (DSS) 100 MG capsule  losartan (COZAAR) 25 MG tablet  metoprolol  succinate ER (TOPROL XL) 50 MG 24 hr tablet  sevelamer carbonate (RENVELA) 800 MG tablet          Review of Systems   Constitutional: Negative.    HENT: Negative.    Eyes: Negative.    Respiratory: Negative.    Cardiovascular: Negative.    Gastrointestinal: Positive for abdominal distention and vomiting.        Umbilical hernia.   Endocrine: Negative.    Genitourinary:        On peritoneal dialysis 4 times daily.   Musculoskeletal: Negative.    Skin: Negative.    Allergic/Immunologic: Negative.    Neurological: Negative.    Hematological: Negative.    Psychiatric/Behavioral:        Autistic with decreased verbal skills.       Physical Exam     Vitals:    05/07/23 1435 05/07/23 1439 05/07/23 1440 05/07/23 1445   BP: 95/65  99/65 98/60   Pulse: 85  82 92   Resp: 21 23 26   Temp:  98.3  F (36.8  C)     TempSrc:  Oral     SpO2: 98%  99% 100%   Weight:           Physical Exam  Vitals and nursing note reviewed.   Cardiovascular:      Rate and Rhythm: Normal rate and regular rhythm.      Heart sounds: Normal heart sounds.   Pulmonary:      Effort: Pulmonary effort is normal.      Breath sounds: Normal breath sounds.   Abdominal:      Palpations: Abdomen is soft.      Tenderness: There is no abdominal tenderness. There is no right CVA tenderness, left CVA tenderness, guarding or rebound.      Hernia: A hernia is present.      Comments: Soft umbilical hernia.   Skin:     General: Skin is warm and dry.      Comments: Peritoneal catheter in place with no erythema, drainage, or toxic striations surrounding.   Neurological:      Mental Status: He is alert. Mental status is at baseline.   Psychiatric:         Behavior: Behavior normal.         ED Course              ED Course as of 05/07/23 1459   Sun May 07, 2023   1200 In to see patient and history/physical completed.    1206 To be ordered, lab work ordered, prochlorperazine for nausea ordered.   1251 Noted to have hypotension with systolic in the 70s.  Patient is alert.   Initiated 1 L LR bolus.   1300 Lactic Acid(!!): 4.1  High critical lactic acid of 4.1 noted.   1350 Blood pressure remains low systolically in the 70s-80s after 1 L of fluid.  Second liter LR bolus initiated.  With this did order lactic acid and procalcitonin.   1405 Patient continues to have hypotension despite 2 L of fluid.  Norepinephrine drip initiated.   1433 Discussed case with Dr. Ontiveros at [facility] who graciously excepted patient for transfer.   1437 Able to get aspirate from peritoneal dialysis site.  Sent for culture.          Results for orders placed or performed during the hospital encounter of 05/07/23 (from the past 24 hour(s))   CBC with platelets differential    Narrative    The following orders were created for panel order CBC with platelets differential.  Procedure                               Abnormality         Status                     ---------                               -----------         ------                     CBC with platelets and d...[[id number]]  Abnormal            Final result                 Please view results for these tests on the individual orders.   Comprehensive metabolic panel   Result Value Ref Range    Sodium 135 (L) 136 - 145 mmol/L    Potassium 3.2 (L) 3.4 - 5.3 mmol/L    Chloride 84 (L) 98 - 107 mmol/L    Carbon Dioxide (CO2) 28 22 - 29 mmol/L    Anion Gap 23 (H) 7 - 15 mmol/L    Urea Nitrogen 47.2 (H) 6.0 - 20.0 mg/dL    Creatinine 15.25 (H) 0.67 - 1.17 mg/dL    Calcium 10.7 (H) 8.6 - 10.0 mg/dL    Glucose 178 (H) 70 - 99 mg/dL    Alkaline Phosphatase 180 (H) 40 - 129 U/L    AST 43 10 - 50 U/L    ALT 52 (H) 10 - 50 U/L    Protein Total 9.7 (H) 6.4 - 8.3 g/dL    Albumin 4.9 3.5 - 5.2 g/dL    Bilirubin Total 0.6 <=1.2 mg/dL    GFR Estimate 4 (L) >60 mL/min/1.73m2   Lipase   Result Value Ref Range    Lipase 18 13 - 60 U/L   CBC with platelets and differential   Result Value Ref Range    WBC Count 14.8 (H) 4.0 - 11.0 10e3/uL    RBC Count 6.79 (H) 4.40 -  5.90 10e6/uL    Hemoglobin 18.1 (H) 13.3 - 17.7 g/dL    Hematocrit 57.0 (H) 40.0 - 53.0 %    MCV 84 78 - 100 fL    MCH 26.7 26.5 - 33.0 pg    MCHC 31.8 31.5 - 36.5 g/dL    RDW 17.5 (H) 10.0 - 15.0 %    Platelet Count 510 (H) 150 - 450 10e3/uL    % Neutrophils 77 %    % Lymphocytes 15 %    % Monocytes 7 %    % Eosinophils 0 %    % Basophils 1 %    % Immature Granulocytes 0 %    NRBCs per 100 WBC 0 <1 /100    Absolute Neutrophils 11.3 (H) 1.6 - 8.3 10e3/uL    Absolute Lymphocytes 2.2 0.8 - 5.3 10e3/uL    Absolute Monocytes 1.1 0.0 - 1.3 10e3/uL    Absolute Eosinophils 0.0 0.0 - 0.7 10e3/uL    Absolute Basophils 0.2 0.0 - 0.2 10e3/uL    Absolute Immature Granulocytes 0.1 <=0.4 10e3/uL    Absolute NRBCs 0.0 10e3/uL   [place] Draw    Narrative    The following orders were created for panel order [place] Draw.  Procedure                               Abnormality         Status                     ---------                               -----------         ------                     Extra Blue Top Tube[[id number]]                              Final result               Extra Red Top Tube[[id number]]                               Final result               Extra Green Top (Lithium...[[id number]]                                                   Please view results for these tests on the individual orders.   Extra Blue Top Tube   Result Value Ref Range    Hold Specimen JIC    Extra Red Top Tube   Result Value Ref Range    Hold Specimen JIC    Extra Tube    Narrative    The following orders were created for panel order Extra Tube.  Procedure                               Abnormality         Status                     ---------                               -----------         ------                     Extra Heparinized Syringe[[id number]]                        Final result                 Please view results for these tests on the individual orders.   Extra Heparinized Syringe   Result Value Ref Range    Hold Specimen JIC     Lactic acid whole blood   Result Value Ref Range    Lactic Acid 4.1 (HH) 0.7 - 2.0 mmol/L   Procalcitonin   Result Value Ref Range    Procalcitonin 1.19 (H) <0.05 ng/mL   CT Chest Abdomen Pelvis w/o Contrast    Narrative    PROCEDURE: CT CHEST ABDOMEN PELVIS W/O CONTRAST 5/7/2023 1:59 PM    HISTORY: hypotension    COMPARISONS: None.    Meds/Dose Given:    TECHNIQUE: T scan of the chest abdomen and pelvis without IV contrast  sagittal coronal reconstructions were obtained.    FINDINGS: There are no pulmonary masses or infiltrates. The heart is  normal in size The hilar and mediastinal lymph nodes are normal in  caliber axillary and supraclavicular lymph nodes are normal. The  regional skeleton is intact.    The liver spleen and pancreas appear normal. The adrenal glands are  normal.    There is polycystic kidney disease. There is a peritoneal dialysis  catheter and there is free fluid seen within the abdomen. No  intraperitoneal masses are noted. The bladder is empty. The rectum  appears normal.    There is an umbilical and adjacent supraumbilical abdominal wall  hernias containing fat. Significant edema is seen in the subcutaneous  tissues surrounding the umbilicus.         Impression    IMPRESSION: Polycystic kidney disease.    Peritoneal dialysis catheter with its tip in the pelvis. There is free  fluid in the abdomen presumably dialysis fluid.    Significant edema in the subcutaneous tissue surrounding the  umbilicus. Small umbilical hernias containing fat    APPLE JOYCE MD         SYSTEM ID:  [id number]       Medications   norepinephrine (LEVOPHED) 4 mg in  mL PERIPHERAL infusion (0 mcg/kg/min × 79.3 kg Intravenous ED Infusing on Admission/transfer 5/7/23 1447)   piperacillin-tazobactam (ZOSYN) intermittent infusion 4.5 g (4.5 g Intravenous $New Bag by Other Clinician 5/7/23 1450)   vancomycin (VANCOCIN) 1,750 mg in 0.9% NaCl 500 mL intermittent infusion (1,750 mg Intravenous $Given by Other Clinician  5/7/23 1448)   prochlorperazine (COMPAZINE) injection 10 mg (10 mg Intravenous $Given 5/7/23 1316)   lactated ringers BOLUS 1,000 mL (0 mLs Intravenous Stopped 5/7/23 1329)   lactated ringers BOLUS 1,000 mL (0 mLs Intravenous Stopped 5/7/23 1448)       Assessments & Plan (with Medical Decision Making)     I have reviewed the nursing notes.    I have reviewed the findings, diagnosis, plan and need for follow up with the patient.    Summary:  Patient presents to the ER today with nausea and vomiting.  Potential diagnosis which have been considered and evaluated include bowel obstruction, gastroenteritis, other viral illness, electrolyte abnormality, as well as others. Many of these have been excluded using the various modalities and assessment as noted on the chart. At the present time, the diagnosis given seems to be the most likely septic shock from likely intra-abdominal source.  Upon arrival, vitals signs show blood pressure 106/62 with a pulse of 112.  Temperature 36.3  C.  Respirations 18 with oxygenation of 99% on room air.  The patient is alert.  Does have normal cardiac and respiratory examination.  Abdomen is rotund but soft.  No obvious guarding, rebound present.  Does have umbilical hernia that is soft and nontender.  Peritoneal catheter in place with no signs of infection or drainage.    IV ordered and prochlorperazine 10 mg given in addition to LR x1 L for hydration.  Lab work obtained showing WBC of 14.8 with hemoglobin of 18.1 and hematocrit of 57.0 making sepsis versus dehydration likely.  Sodium 135 with potassium of 3.2 which is reassuring.  BUN 47 with creatinine of 15.25 and GFR for which has worsened significantly since 2 of 2023.  Lactic acid did come back high critical at 4.1 with procalcitonin of 1.19.  Blood cultures ordered but lab only able to get 1 set.  Able to get fluid from peritoneal dialysis catheter and sent for culture.  Due to hernia and now nausea and vomiting with unknown last  bowel movement time, CT of abdomen pelvis was ordered showing polycystic kidney disease.  Does have peritoneal dialysis catheter in the pelvis with free fluid around presumptively dialysis fluid.  Does have significant edema in the subcutaneous tissue surrounding the umbilicus with small umbilical hernias containing fat.  When patient laid down upon arrival did develop hypotension with systolic blood pressure in the 70's.  First liter of LR was initiated.  Patient continued to have hypotension after first liter of IV fluids.  Systolic blood pressure remained in the 70's so second liter initiated.  Patient remained drowsy but opens eyes to verbal stimuli and denied issues.  With second liter bolus, no improvement with systolic blood pressure.  Started norepinephrine drip which did have improvement of blood pressure.  With patient having leukocytosis with elevated procalcitonin and lactic acid, sepsis antibiotics consisting of Zosyn and vancomycin initiated.  Discussed case with [facility] Dr. Amato him for transfer as patient will need higher level of care.  Patient graciously excepted for transfer for patient with septic shock likely from intra-abdominal source.    Patient transferred via EMS for further care.          Impression and plan discussed with patient. Questions answered, concerns addressed, indications for urgent re-evaluation reviewed, and  given. Patient/Parent/Caregiver agree with treatment plan and have no further questions at this time.        This note was created by the Dragon Voice Dictation System. Inadvertent typographical errors, due to software recognition problems, may still exist.        New Prescriptions    No medications on file       Final diagnoses:   Septic shock (H)       5/7/2023   [facility]     Fred Rivas APRN CNP  05/07/23 4052

## 2023-05-07 NOTE — ED NOTES
Notified PA of VS after bolus completed. RN verified with PA that CT be done at this time with VS as charted, PA would like CT done with monitoring at this time.

## 2023-05-07 NOTE — ED NOTES
Mother reports patient had only 2 emesis today. No emesis yesterday. Reports he is more tired appearing since yesterday. Last dialysis prior to arrival was at 1100. Had hemodialysis catheter removed 2 weeks ago at . Normal SBP at home is above 100.

## 2023-05-07 NOTE — ED NOTES
Mother reports patient did not want to eat this morning, only had a few bites of toast. Has vomited a few times since last night, reports it was yellow although had been drinking orange juice so was unsure if this was why. Mother noticed she has only been getting about 1400cc off of fluid during dialysis each session after instilling 2000cc of fluid. Reports no change in the look of the fluid. Reports she is unsure if patient has been having bowel movements as he will not allow her into the restroom with him although he does not report things verbally due to mostly non-verbal autism. Unsure if he has any urinary output although believes he may. Lump the size of a golf ball is new in the last few days just above umbilicus, between dialysis catheters. Lump is soft to touch. Mother is unsure if patient is in pain as he does not report pain at baseline. Feels his abdomen is more distended than normal. Abdomen is soft to touch.

## 2023-05-07 NOTE — ED TRIAGE NOTES
21 y/o male presents with Mom with reports of vomiting since last night as well as abdominal distention. Mother is primary care taker; patient is mostly nonverbal (autism). Patient receives peritoneal dialysis 4x daily.

## 2023-05-07 NOTE — ED NOTES
Notified PA of low BP. Patient resting on cot with eyes closed. Cooperative. Opens eyes and responds to voice appropriately and baseline. 2nd IV placed and on monitoring.

## 2023-05-07 NOTE — PHARMACY-VANCOMYCIN DOSING SERVICE
Pharmacy received consult to dose vancomycin for ED for one time dosing. Indication(s): Sepsis, Intra-Abdomnial Infection. Dosed at 22 mg/kg using weight of 79.3 kg, which gave a calculated dose of 1750 mg. Please order another pharmacy to dose vancomycin consult if admitted and vancomycin is needed. Thank you.    Priya Camacho, PharmD on May 7, 2023

## 2023-05-13 LAB — BACTERIA BLD CULT: NO GROWTH

## 2023-05-14 LAB
BACTERIA PRT CULT: NO GROWTH
GRAM STAIN RESULT: NORMAL
GRAM STAIN RESULT: NORMAL

## 2024-02-05 ENCOUNTER — TRANSFERRED RECORDS (OUTPATIENT)
Dept: HEALTH INFORMATION MANAGEMENT | Facility: CLINIC | Age: 24
End: 2024-02-05

## 2024-06-12 ENCOUNTER — REFERRAL (OUTPATIENT)
Dept: TRANSPLANT | Facility: CLINIC | Age: 24
End: 2024-06-12

## 2024-06-13 ENCOUNTER — REFERRAL (OUTPATIENT)
Dept: TRANSPLANT | Facility: CLINIC | Age: 24
End: 2024-06-13

## 2024-06-13 DIAGNOSIS — N18.6 END STAGE RENAL DISEASE (H): ICD-10-CM

## 2024-06-13 DIAGNOSIS — Z76.82 ORGAN TRANSPLANT CANDIDATE: ICD-10-CM

## 2024-06-13 DIAGNOSIS — I25.10 CARDIOVASCULAR DISEASE: ICD-10-CM

## 2024-06-13 DIAGNOSIS — Q61.3 POLYCYSTIC KIDNEY DISEASE: ICD-10-CM

## 2024-06-13 DIAGNOSIS — N18.6 ESRD (END STAGE RENAL DISEASE) (H): Primary | ICD-10-CM

## 2024-06-13 DIAGNOSIS — N18.5 CHRONIC KIDNEY DISEASE, STAGE V (H): ICD-10-CM

## 2024-06-13 DIAGNOSIS — I10 ESSENTIAL HYPERTENSION: ICD-10-CM

## 2024-06-13 DIAGNOSIS — Z01.818 PRE-TRANSPLANT EVALUATION FOR KIDNEY TRANSPLANT: ICD-10-CM

## 2024-06-13 NOTE — LETTER
7/15/2024    Robert Alarcon  [address] [place] [id number]    Dear Robert,    Thank you for your interest in the Transplant Center at [facility]. We look forward to being a part of your care team and assisting you through the transplant process.    As we discussed, your transplant coordinator is Sachi Villa (Kidney).  You may call your coordinator at any time with questions or concerns.  Your first scheduled call will be on 7/22/24 between 12:00 PM - 3:00 PM.  If this needs to change, call [phone].    Please complete the following.    Fill out and return the enclosed forms  Authorization for Electronic Communication  Authorization to Discuss Protected Health Information  Authorization for Release of Protected Health Information    Sign up for:  [employer], access to your electronic medical record (see enclosed pamphlet)  [employer], a transplant education website        You can use these tools to learn more about your transplant, communicate with your care team, and track your medical details      Sincerely,      Solid Organ Transplant  [facility]    cc: Referring Physician

## 2024-07-01 ENCOUNTER — TELEPHONE (OUTPATIENT)
Dept: TRANSPLANT | Facility: CLINIC | Age: 24
End: 2024-07-01

## 2024-07-01 NOTE — TELEPHONE ENCOUNTER
Patient Call: General  Route to LPN    Reason for call: Mother returning call back on behalf of patient.    Call back needed? Yes    Return Call Needed  Same as documented in contacts section  When to return call?: Same day: Route High Priority

## 2024-07-08 NOTE — TELEPHONE ENCOUNTER
[facility] Call Center    Phone Message    May a detailed message be left on voicemail: yes     Reason for Call: Other: Patient's mother Daniela calling back to follow up on patient's referral.Please contact her.     Action Taken: Message routed to:  [facility] CARLY CARRASCO    Travel Screening: Not Applicable     Date of Service:

## 2024-07-09 NOTE — TELEPHONE ENCOUNTER
Tried to return call to Daniela (mother) but both cell phone and home phone voice mail is full and cannot leave a message.

## 2024-07-11 ENCOUNTER — TELEPHONE (OUTPATIENT)
Dept: TRANSPLANT | Facility: CLINIC | Age: 24
End: 2024-07-11

## 2024-07-11 NOTE — TELEPHONE ENCOUNTER
Returned call to mother Daniela. Her son Robert needs a transplant and they started working with [facility] but the distance is just too far. They would like to come here. Message sent to [facility] to begin the referral process. Also, let [facility] know there is a slot open for 7/18 and Robert can take that slot if it works out.

## 2024-07-15 VITALS — HEIGHT: 72 IN | WEIGHT: 187.7 LBS | BODY MASS INDEX: 25.42 KG/M2

## 2024-07-15 PROBLEM — R77.9 ELEVATED BLOOD PROTEIN: Status: ACTIVE | Noted: 2022-08-05

## 2024-07-15 PROBLEM — N18.9 ACUTE RENAL FAILURE SUPERIMPOSED ON CHRONIC KIDNEY DISEASE (H): Status: ACTIVE | Noted: 2022-08-05

## 2024-07-15 PROBLEM — R79.89 ELEVATED LACTIC ACID LEVEL: Status: ACTIVE | Noted: 2022-08-05

## 2024-07-15 PROBLEM — R73.9 HYPERGLYCEMIA, UNSPECIFIED: Status: ACTIVE | Noted: 2022-08-05

## 2024-07-15 PROBLEM — D63.1 ANEMIA OF CHRONIC RENAL FAILURE, UNSPECIFIED CKD STAGE: Chronic | Status: ACTIVE | Noted: 2022-08-05

## 2024-07-15 PROBLEM — E87.6 HYPOKALEMIA: Status: ACTIVE | Noted: 2022-08-05

## 2024-07-15 PROBLEM — Z99.2 DEPENDENCE ON RENAL DIALYSIS (H): Status: ACTIVE | Noted: 2022-08-09

## 2024-07-15 PROBLEM — R65.11: Status: ACTIVE | Noted: 2023-03-13

## 2024-07-15 PROBLEM — R63.4 ABNORMAL WEIGHT LOSS: Chronic | Status: ACTIVE | Noted: 2022-08-05

## 2024-07-15 PROBLEM — D75.1 POLYCYTHEMIA: Status: ACTIVE | Noted: 2024-02-05

## 2024-07-15 PROBLEM — T78.2XXA ANAPHYLACTIC SHOCK, UNSPECIFIED, INITIAL ENCOUNTER: Status: ACTIVE | Noted: 2023-03-13

## 2024-07-15 PROBLEM — A41.9 BACTERIAL SEPSIS (H): Status: ACTIVE | Noted: 2023-05-07

## 2024-07-15 PROBLEM — Q61.2 POLYCYSTIC KIDNEY, ADULT TYPE: Status: ACTIVE | Noted: 2022-08-09

## 2024-07-15 PROBLEM — N17.9 ACUTE RENAL FAILURE SUPERIMPOSED ON CHRONIC KIDNEY DISEASE (H): Status: ACTIVE | Noted: 2022-08-05

## 2024-07-15 PROBLEM — K42.9 UMBILICAL HERNIA: Status: ACTIVE | Noted: 2023-05-07

## 2024-07-15 PROBLEM — T78.40XA ALLERGY, UNSPECIFIED, INITIAL ENCOUNTER: Status: ACTIVE | Noted: 2023-03-13

## 2024-07-15 PROBLEM — N18.9 ANEMIA OF CHRONIC RENAL FAILURE, UNSPECIFIED CKD STAGE: Chronic | Status: ACTIVE | Noted: 2022-08-05

## 2024-07-15 NOTE — TELEPHONE ENCOUNTER
[facility] KIDNEY INTAKE     PCP: no pcp   Referring Organization: [employer]  Referring Provider: Higinio Lewis MD  Referring Diagnosis: ERSD Peritoneal Dialysis     GFR/Date: 6 5/22/24      Is patient diabetic? no (if not diabetic go to next section)  Is patient on insulin? no  Is patient under the age of 65? yes  Was patient offered a pancreas transplant referral? no    Previous transplants no  Cancer history: no  Cardiac history: no  Biopsy no  Oxygen use at rest: no with activity: no    BMI: 25.46 (BMI> 40 - RNCC call only/ no PKE date)      Is patient in a group home/assisted living? home  Does patient have a guardian? yes    Referral intake process completed.  Patient is aware that after financial approval is received, medical records will be requested.   Patient confirmed for a callback from transplant coordinator on 7/22/24. (within 2 weeks)  Tentative evaluation date 10/17/24 slot 3 .    Confirmed coordinator will discuss evaluation process in more detail at the time of their call.   Patient is aware of the need to arrange age appropriate cancer screening, vaccinations, and dental care.  Reminded patient to complete questionnaire, complete medical records release, and review packet prior to evaluation visit .  Assessed patient for special needs (ie-wheelchair, assistance, guardian, and ):  mom is guardian   Patient instructed to call [phone] with questions.     Patient gave verbal consent during intake call to obtain medical records and documents outside of [facility]:  yes

## 2024-07-22 ENCOUNTER — TELEPHONE (OUTPATIENT)
Dept: TRANSPLANT | Facility: CLINIC | Age: 24
End: 2024-07-22
Payer: COMMERCIAL

## 2024-07-22 NOTE — TELEPHONE ENCOUNTER
Reviewed pt's chart for pre-Kidney transplant evaluation planning. Coordinator first call on 7/22/24. PKE STD on Thursday, 10/17/2024 slot #3.      services required: No. Is pt able to attend virtual education class? No - Does not have active MyChart    Pt has ESRD d/t PCKD, biopsy none.  Pt is on dialysis, HD, now switch to PD started on 8/9/2022. Pt is not a diabetic.     Health hx: pre-mature birth, autism spectrum disorder - non-verbal, polcycthemia, bacterial sepsis.    Heart hx: HTN, Left Ventricular Dilation, CHF.    Lung hx: none.   Surgical hx: PD cath placement, umbilical hernia repair.      Pt is not/never smoker, does not consume alcohol, and does not use recreational drugs. Does not have current infection, non-healing wounds, or active cancer.    Health maintenance items: BMI 25 on 7/15/24.  Colonoscopy: n/a.  Dental: TBD.  Vaccinations: UTD.     Pt is active, needs mothers assistance w/ ADLs.  Pt lives in [place], [place] w/ mother who is legal guardian.  Has support following transplant. Pt ? living donors.     Imaging Available: Will request CT abdomen/pelvis w/o contrast from 5/22/2024    Contacted patient and introduced myself as their Transplant Coordinator, also introduced the role of the Transplant Coordinator in the transplant process.  Explained the purpose of this call including reviewing next steps and answering questions.      Confirmed Referring Provider, Dr. Higinio Lewis; Dialysis Center, [facility]; and Primary Care Physician, Dr. Aaron Mack. Explained to the patient of the importance of continued communication with referring providers and primary care physicians.      Reviewed components of transplant evaluation process including necessary appointments, tests, and procedures.  Instructed pt to bring 1-2 people with them to eval and to eat and drink and normally on eval day    Answered questions for patient regarding evaluation, provided my name and contact information and  requested they call/message with any additional questions or concerns.  Informed patient they will receive a letter with information discussed in referral call. Determined that patient would like information regarding transplant by:       Mail, Email, MyChart, and/or Phone Call.  Encouraged the use of [employer].    Informed pt about transplant educational website, asked to watch pre-kidney or sign up for education class prior to evaluation. Link for educational videos were provided.  Additional informational web sites about transplant were discussed. Links provided to www.[employer]os.org and www.srtr.org in letter sent to patient.  Pt expressed  understanding of all and were in  agreement with the plan.    Confirmed STD Thursday 10/17/24 slot . Informed pt they will hear from scheduling to arrange appointment times for evaluation day. As well as, receive an email from our Office prior to eval with a Receipt of Info and educational materials - instructed to read materials and sign consent prior to eval. Smartset orders entered.

## 2024-09-19 ENCOUNTER — TELEPHONE (OUTPATIENT)
Dept: TRANSPLANT | Facility: CLINIC | Age: 24
End: 2024-09-19
Payer: COMMERCIAL

## 2024-09-24 ENCOUNTER — TELEPHONE (OUTPATIENT)
Dept: TRANSPLANT | Facility: CLINIC | Age: 24
End: 2024-09-24
Payer: COMMERCIAL

## 2024-09-24 NOTE — TELEPHONE ENCOUNTER
[facility] Call Center    Phone Message    May a detailed message be left on voicemail: yes     Reason for Call: Other: patients mother is requesting a list of local hotels be emailed to her at [email or web address], thank you.     Action Taken: Message routed to:  Other: RNCC    Travel Screening: Not Applicable

## 2024-10-15 NOTE — PROGRESS NOTES
"[facility] SOLID ORGAN TRANSPLANT  OUTPATIENT MNT: KIDNEY TRANSPLANT EVALUATION    Current BMI: 28.7 (HT 70.5 in,  lbs/92 kg)  BMI guideline for kidney transplant up to a BMI of 40 / per surgeon discretion     Frailty Assessment-- Not Frail (2/5 points)- reduced , low activity   Handgrip Strength: 22    Reference:  Score of 0-2 = Not Frail  Score of 3-5 = Frail      TIME SPENT: 15 minutes  VISIT TYPE: Initial   REFERRING PHYSICIAN: Ronak  PT ACCOMPANIED BY: his mom and his mom's friend     History of previous txp: none   Dialysis: HD 8/2022-->PD 8/2023    NUTRITION ASSESSMENT  Pt is non verbal, mom is legal guardian & provided all the information for today's interview.     - Meal prep & grocery shopping: pt's mom   - Previous RD education: yes  - Appetite: baseline for pt, but very picky   - Food allergies/intolerances: none   - Issues chewing or swallowing: none   - N/V/D/C: some vomiting - \"normal\" for him, not daily  - Food access concerns: not asked     Vitamins, Supplements, Pertinent Meds: dialyvite, renvela (takes consistently)  Herbal Medicines/Supplements: none   Protein Supplements: occasional Ensure or Boost Breeze     Weight hx // fluid retention:   - no PETER  - overall stable     PHYSICAL ACTIVITY   No routine activity (mom reports he is very sedentary at baseline); mom helps with showering - long term habit  1 block would be OK for walking    DIET RECALL  Breakfast Yogurt or pastry    Lunch PBJ s/w with bag of chips and juice (any kind)- OJ more lately    Dinner Pizza, hot dogs  No meat apart from hot dogs or chix nuggets     Snacks Some chips, gold fish    Beverages Soda (2 cans/day-- Mountain Dew), juice (16-32 oz/day), minimal water/flavored water, occasional milk     Alcohol None    Dining out 1x/month      LABS  Pt's mom reports K/Phos wnl  Last labs on file 5/22/24 K 3.7   No recent Phos on file     NUTRITION DIAGNOSIS   No nutrition diagnosis identified at this time "     NUTRITION INTERVENTION   Nutrition education provided:  Discussed sodium intake (low sodium foods and drinks, seasoning food without salt and tips for low sodium diet).  Reviewed reportedly wnl K/Phos levels.     Reviewed post txp diet guidelines in brief (will review in further detail post txp):  (1) Review of proper food safety measures d/t immunosuppressant therapy post-op and increased risk for food-borne illness    (2) Avoid the following post txp d/t risk for rejection, unknown effects on the organs, and/or potential interactions with immunosuppressants:  - Herbal, Chinese, holistic, chiropractic, natural, alternative medicines and supplements  - Detoxes and cleanses  - Weight loss pills  - Protein powders or other products with extracts or herbs (ie green tea extract)    (3) Med regimen and possible side effects    Patient Understanding: Pt verbalized understanding of education provided.  Expected Engagement: Good  Follow-Up Plans: PRN     NUTRITION GOALS   No nutrition goals identified at this time     Mouna Alberto, RD, LD, CCTD

## 2024-10-16 LAB
ABO/RH(D): NORMAL
ANTIBODY SCREEN: NEGATIVE
SPECIMEN EXPIRATION DATE: NORMAL

## 2024-10-17 ENCOUNTER — ALLIED HEALTH/NURSE VISIT (OUTPATIENT)
Dept: TRANSPLANT | Facility: CLINIC | Age: 24
End: 2024-10-17
Attending: NURSE PRACTITIONER
Payer: COMMERCIAL

## 2024-10-17 ENCOUNTER — ALLIED HEALTH/NURSE VISIT (OUTPATIENT)
Dept: TRANSPLANT | Facility: CLINIC | Age: 24
End: 2024-10-17

## 2024-10-17 ENCOUNTER — LAB (OUTPATIENT)
Dept: LAB | Facility: CLINIC | Age: 24
End: 2024-10-17
Attending: NURSE PRACTITIONER
Payer: COMMERCIAL

## 2024-10-17 ENCOUNTER — HOSPITAL ENCOUNTER (OUTPATIENT)
Dept: GENERAL RADIOLOGY | Facility: CLINIC | Age: 24
Discharge: HOME | End: 2024-10-17
Attending: NURSE PRACTITIONER
Payer: COMMERCIAL

## 2024-10-17 ENCOUNTER — HOSPITAL ENCOUNTER (OUTPATIENT)
Dept: CARDIOLOGY | Facility: CLINIC | Age: 24
Discharge: HOME | End: 2024-10-17
Attending: NURSE PRACTITIONER
Payer: COMMERCIAL

## 2024-10-17 VITALS
OXYGEN SATURATION: 98 % | HEIGHT: 70 IN | DIASTOLIC BLOOD PRESSURE: 90 MMHG | SYSTOLIC BLOOD PRESSURE: 144 MMHG | HEART RATE: 100 BPM | BODY MASS INDEX: 29.06 KG/M2 | WEIGHT: 203 LBS

## 2024-10-17 DIAGNOSIS — Z76.82 ORGAN TRANSPLANT CANDIDATE: ICD-10-CM

## 2024-10-17 DIAGNOSIS — N18.6 END STAGE RENAL DISEASE (H): ICD-10-CM

## 2024-10-17 DIAGNOSIS — I25.10 CARDIOVASCULAR DISEASE: ICD-10-CM

## 2024-10-17 DIAGNOSIS — I10 ESSENTIAL HYPERTENSION: ICD-10-CM

## 2024-10-17 DIAGNOSIS — Z01.818 PRE-TRANSPLANT EVALUATION FOR KIDNEY TRANSPLANT: ICD-10-CM

## 2024-10-17 DIAGNOSIS — N18.5 CHRONIC KIDNEY DISEASE, STAGE V (H): ICD-10-CM

## 2024-10-17 DIAGNOSIS — Z01.818 PRE-TRANSPLANT EVALUATION FOR KIDNEY TRANSPLANT: Primary | ICD-10-CM

## 2024-10-17 DIAGNOSIS — Q61.3 POLYCYSTIC KIDNEY DISEASE: ICD-10-CM

## 2024-10-17 DIAGNOSIS — N18.6 ESRD (END STAGE RENAL DISEASE) (H): ICD-10-CM

## 2024-10-17 DIAGNOSIS — Z76.82 ORGAN TRANSPLANT CANDIDATE: Primary | ICD-10-CM

## 2024-10-17 DIAGNOSIS — Q61.3 POLYCYSTIC KIDNEY: ICD-10-CM

## 2024-10-17 DIAGNOSIS — D75.1 POLYCYTHEMIA: ICD-10-CM

## 2024-10-17 DIAGNOSIS — Z01.818 ENCOUNTER FOR PRE-TRANSPLANT EVALUATION FOR KIDNEY TRANSPLANT: ICD-10-CM

## 2024-10-17 DIAGNOSIS — Z01.818 ENCOUNTER FOR PRE-TRANSPLANT EVALUATION FOR KIDNEY TRANSPLANT: Primary | ICD-10-CM

## 2024-10-17 LAB
A1 AG RBC QL: NEGATIVE
ABO/RH(D): NORMAL
ALBUMIN SERPL BCG-MCNC: 4.3 G/DL (ref 3.5–5.2)
ALBUMIN UR-MCNC: 200 MG/DL
ALP SERPL-CCNC: 175 U/L (ref 40–150)
ALT SERPL W P-5'-P-CCNC: 6 U/L (ref 0–70)
AMORPH CRY #/AREA URNS HPF: ABNORMAL /HPF
ANION GAP SERPL CALCULATED.3IONS-SCNC: 19 MMOL/L (ref 7–15)
APPEARANCE UR: ABNORMAL
APTT PPP: 34 SECONDS (ref 22–38)
AST SERPL W P-5'-P-CCNC: 10 U/L (ref 0–45)
BASOPHILS # BLD AUTO: 0.2 10E3/UL (ref 0–0.2)
BASOPHILS NFR BLD AUTO: 1 %
BILIRUB SERPL-MCNC: 0.3 MG/DL
BILIRUB UR QL STRIP: NEGATIVE
BUN SERPL-MCNC: 17.5 MG/DL (ref 6–20)
CALCIUM SERPL-MCNC: 8.7 MG/DL (ref 8.8–10.4)
CHLORIDE SERPL-SCNC: 95 MMOL/L (ref 98–107)
COLOR UR AUTO: ABNORMAL
CREAT SERPL-MCNC: 12.8 MG/DL (ref 0.67–1.17)
EGFRCR SERPLBLD CKD-EPI 2021: 5 ML/MIN/1.73M2
EOSINOPHIL # BLD AUTO: 0.1 10E3/UL (ref 0–0.7)
EOSINOPHIL NFR BLD AUTO: 1 %
ERYTHROCYTE [DISTWIDTH] IN BLOOD BY AUTOMATED COUNT: 14.6 % (ref 10–15)
FACTOR 2 INTERPRETATION: NORMAL
FACTOR V INTERPRETATION: NORMAL
GLUCOSE SERPL-MCNC: 98 MG/DL (ref 70–99)
GLUCOSE UR STRIP-MCNC: NEGATIVE MG/DL
HBV CORE AB SERPL QL IA: NONREACTIVE
HBV SURFACE AB SERPL IA-ACNC: 355 M[IU]/ML
HBV SURFACE AB SERPL IA-ACNC: REACTIVE M[IU]/ML
HBV SURFACE AG SERPL QL IA: NONREACTIVE
HCO3 SERPL-SCNC: 28 MMOL/L (ref 22–29)
HCT VFR BLD AUTO: 47.8 % (ref 40–53)
HCV AB SERPL QL IA: NONREACTIVE
HGB BLD-MCNC: 14.4 G/DL (ref 13.3–17.7)
HGB UR QL STRIP: ABNORMAL
HIV 1+2 AB+HIV1 P24 AG SERPL QL IA: NONREACTIVE
HOLD SPECIMEN: NORMAL
IMM GRANULOCYTES # BLD: 0.1 10E3/UL
IMM GRANULOCYTES NFR BLD: 0 %
INR PPP: 1.1 (ref 0.85–1.15)
KETONES UR STRIP-MCNC: NEGATIVE MG/DL
LAB DIRECTOR COMMENTS: NORMAL
LAB DIRECTOR DISCLAIMER: NORMAL
LAB DIRECTOR INTERPRETATION: NORMAL
LAB DIRECTOR METHODOLOGY: NORMAL
LAB DIRECTOR RESULTS: NORMAL
LEUKOCYTE ESTERASE UR QL STRIP: ABNORMAL
LVEF ECHO: NORMAL
LYMPHOCYTES # BLD AUTO: 2 10E3/UL (ref 0.8–5.3)
LYMPHOCYTES NFR BLD AUTO: 15 %
MCH RBC QN AUTO: 25.2 PG (ref 26.5–33)
MCHC RBC AUTO-ENTMCNC: 30.1 G/DL (ref 31.5–36.5)
MCV RBC AUTO: 84 FL (ref 78–100)
MONOCYTES # BLD AUTO: 0.9 10E3/UL (ref 0–1.3)
MONOCYTES NFR BLD AUTO: 7 %
NEUTROPHILS # BLD AUTO: 10 10E3/UL (ref 1.6–8.3)
NEUTROPHILS NFR BLD AUTO: 76 %
NITRATE UR QL: NEGATIVE
NRBC # BLD AUTO: 0 10E3/UL
NRBC BLD AUTO-RTO: 0 /100
PH UR STRIP: 7.5 [PH] (ref 5–7)
PLATELET # BLD AUTO: 486 10E3/UL (ref 150–450)
POTASSIUM SERPL-SCNC: 3 MMOL/L (ref 3.4–5.3)
PROT SERPL-MCNC: 8 G/DL (ref 6.4–8.3)
RBC # BLD AUTO: 5.72 10E6/UL (ref 4.4–5.9)
RBC URINE: >182 /HPF
SODIUM SERPL-SCNC: 142 MMOL/L (ref 135–145)
SP GR UR STRIP: 1.01 (ref 1–1.03)
SPECIMEN DESCRIPTION: NORMAL
SPECIMEN EXPIRATION DATE: NORMAL
SPECIMEN EXPIRATION DATE: NORMAL
SQUAMOUS EPITHELIAL: 2 /HPF
T PALLIDUM AB SER QL: NONREACTIVE
UROBILINOGEN UR STRIP-MCNC: NORMAL MG/DL
WBC # BLD AUTO: 13.2 10E3/UL (ref 4–11)
WBC URINE: 38 /HPF

## 2024-10-17 PROCEDURE — 36415 COLL VENOUS BLD VENIPUNCTURE: CPT

## 2024-10-17 PROCEDURE — 71046 X-RAY EXAM CHEST 2 VIEWS: CPT | Mod: 26 | Performed by: RADIOLOGY

## 2024-10-17 PROCEDURE — 86644 CMV ANTIBODY: CPT

## 2024-10-17 PROCEDURE — 81240 F2 GENE: CPT

## 2024-10-17 PROCEDURE — 86787 VARICELLA-ZOSTER ANTIBODY: CPT

## 2024-10-17 PROCEDURE — 93306 TTE W/DOPPLER COMPLETE: CPT

## 2024-10-17 PROCEDURE — 85670 THROMBIN TIME PLASMA: CPT

## 2024-10-17 PROCEDURE — 86803 HEPATITIS C AB TEST: CPT

## 2024-10-17 PROCEDURE — 86900 BLOOD TYPING SEROLOGIC ABO: CPT

## 2024-10-17 PROCEDURE — 81382 HLA II TYPING 1 LOC HR: CPT

## 2024-10-17 PROCEDURE — 82040 ASSAY OF SERUM ALBUMIN: CPT

## 2024-10-17 PROCEDURE — 71046 X-RAY EXAM CHEST 2 VIEWS: CPT

## 2024-10-17 PROCEDURE — 93005 ELECTROCARDIOGRAM TRACING: CPT

## 2024-10-17 PROCEDURE — 81001 URINALYSIS AUTO W/SCOPE: CPT

## 2024-10-17 PROCEDURE — 86665 EPSTEIN-BARR CAPSID VCA: CPT

## 2024-10-17 PROCEDURE — 86147 CARDIOLIPIN ANTIBODY EA IG: CPT

## 2024-10-17 PROCEDURE — 93306 TTE W/DOPPLER COMPLETE: CPT | Mod: 26 | Performed by: STUDENT IN AN ORGANIZED HEALTH CARE EDUCATION/TRAINING PROGRAM

## 2024-10-17 PROCEDURE — 85390 FIBRINOLYSINS SCREEN I&R: CPT | Mod: 26 | Performed by: PATHOLOGY

## 2024-10-17 PROCEDURE — 85610 PROTHROMBIN TIME: CPT

## 2024-10-17 PROCEDURE — 86704 HEP B CORE ANTIBODY TOTAL: CPT

## 2024-10-17 PROCEDURE — 85730 THROMBOPLASTIN TIME PARTIAL: CPT

## 2024-10-17 PROCEDURE — 86905 BLOOD TYPING RBC ANTIGENS: CPT

## 2024-10-17 PROCEDURE — 86850 RBC ANTIBODY SCREEN: CPT

## 2024-10-17 PROCEDURE — 86481 TB AG RESPONSE T-CELL SUSP: CPT

## 2024-10-17 PROCEDURE — 99205 OFFICE O/P NEW HI 60 MIN: CPT | Performed by: NURSE PRACTITIONER

## 2024-10-17 PROCEDURE — 86780 TREPONEMA PALLIDUM: CPT

## 2024-10-17 PROCEDURE — 86833 HLA CLASS II HIGH DEFIN QUAL: CPT

## 2024-10-17 PROCEDURE — 87340 HEPATITIS B SURFACE AG IA: CPT

## 2024-10-17 PROCEDURE — G0452 MOLECULAR PATHOLOGY INTERPR: HCPCS | Mod: 26 | Performed by: STUDENT IN AN ORGANIZED HEALTH CARE EDUCATION/TRAINING PROGRAM

## 2024-10-17 PROCEDURE — 86706 HEP B SURFACE ANTIBODY: CPT

## 2024-10-17 PROCEDURE — G0463 HOSPITAL OUTPT CLINIC VISIT: HCPCS | Performed by: NURSE PRACTITIONER

## 2024-10-17 PROCEDURE — 87086 URINE CULTURE/COLONY COUNT: CPT

## 2024-10-17 PROCEDURE — 85025 COMPLETE CBC W/AUTO DIFF WBC: CPT

## 2024-10-17 PROCEDURE — 86832 HLA CLASS I HIGH DEFIN QUAL: CPT

## 2024-10-17 PROCEDURE — 99204 OFFICE O/P NEW MOD 45 MIN: CPT | Performed by: NURSE PRACTITIONER

## 2024-10-17 RX ORDER — GENTAMICIN SULFATE 1 MG/G
CREAM TOPICAL
COMMUNITY
Start: 2023-04-06

## 2024-10-17 RX ORDER — METOPROLOL TARTRATE 50 MG
50 TABLET ORAL 2 TIMES DAILY
COMMUNITY

## 2024-10-17 RX ORDER — ASPIRIN 81 MG/1
1 TABLET, CHEWABLE ORAL
COMMUNITY
Start: 2024-02-26

## 2024-10-17 RX ORDER — POTASSIUM CHLORIDE 750 MG/1
CAPSULE, EXTENDED RELEASE ORAL
COMMUNITY
Start: 2024-06-10

## 2024-10-17 NOTE — LETTER
10/17/2024      Robert Alarcon  [address] [facility] [address]      Dear Colleague,    Thank you for referring your patient, Robert Alarcon, to the [facility]. Please see a copy of my visit note below.    Transplant Surgery Consult Note     Medical record number: [id number]  YOB: 2000,   Consult requested  for evaluation of kidney transplant candidacy.    Assessment and Recommendations:Mr. Alarcon appears to be a good candidate for kidney transplantation and has a good understanding of the risks and benefits of this approach to the management of renal failure. The following issues should be addressed prior to finalizing his transplant candidacy:     Transplant order: Mr. Alarcon has End stage renal failure due to polycystic kidney disease (PKD) whose condition is not expected to resolve, is expected to progress, and is expected to continue to develop related comorbid conditions.  Recommend he be considered as a candidate for kidney.  Cardiology consult for cardiac risk stratification to be ordered: Yes  CT abdomen and pelvis without contrast to be ordered for assessment of vascular targets: Yes  Transplant listing labs ordered to include HLA, ABOx2, Cr, etc.  Dietician consult ordered: Yes  Social work consult ordered: Yes  Imaging reports reviewed:  yes  Radiology images reviewed:yes  Recipient suitable to move forward with work up of living donors:  Yes      -Needs brain MRA  -ABO: AB +  -PKD- reviewed by surgeon and states there is room.   -Needs cardiac clearance: HFrEF, cardiomyopathy: ECHO 8/22 pre transplant-severely enlarged Left ventrical. LVEF 28% with severe hypokinesis. Improved to 35-40% in 2023.   -H/o Polycythemia  No evidence of underlying myeloproliferative disorder. Opted for Aspirin 81mg. Last seen Hematology 2/2024. Would recommend hematology clearance.     Umbilical hernia repair with mesh in 5/2024.        The majority of our visit was spent in  counselling, discussing the medical and surgical risks of kidney transplantation. We discussed approximate wait time and how that is influenced by issues such as blood type and sensitization (PRA) and access to a living donor. I contrasted potential waiting time for living vs  donor kidneys from  normal (0-85%) or higher (%) kidney donor profile index (KDPI) donors and their associated outcomes. I would not recommend this individual to consider kidneys from high KDPI donors. The reason for this decision is best summarized as: multiple potential living donors. Potential surgical complications of kidney transplantation include bleeding, superficial or deep wound complications (infection, hernia, lymphocele), ureteral anastomotic failure (leak or stenosis), graft thrombosis, need for reoperation and other issues such as cardiac complications, pneumonia, deep venous thrombosis, pulmonary embolism, post transplant diabetes and death. The potential for recurrent disease or need for retransplantation was also addressed. We discussed the possible need for ureteral stent (and subsequent removal), and the utility of protocol biopsy and laboratory studies to evaluate for rejection or recurrent disease. We discussed the risk of graft rejection, our center's average graft and patient survival rates, immunosuppression protocols, as well as the potential opportunity to participate in clinical trials.  We also discussed the average length of stay, recovery process, and posttransplant lab and monitoring protocol.  I emphasized the need for strict immunosuppression medication adherence and the potential for complications of immunosuppression such as skin cancer or lymphoma, as well as a very low but not zero risk of donor-derived disease transmission risks (infection, cancer). Mr. Alarcon asked good questions and his candidacy will be reviewed at our Multidisciplinary Selection Committee. Thank you for the opportunity to  participate in Mr. Alarcon's care.      Total time: 60 minutes  Counselling time: 40 minutes        ---------------------------------------------------------------------------------------------------    HPI: Mr. Alarcon has End stage renal failure due to polycystic kidney disease (PKD). The patient is non-diabetic.       The patient is on dialysis.    Has potential kidney donors:  Yes .  Interested in participation in paired exchange if a donor is willing: Yes     The patient has the following pertinent history:       No    Yes  Dialysis:    []      [x] via:    PD    Blood Transfusion                  []      [x]  Number of units: unsure  Most recently: unsure  Pregnancy:    [x]      [] Number:       Previous Transplant:  [x]      [] Details:    Cancer    [x]      [] Comment:   Kidney stones   [x]      [] Comment:      Recurrent infections  [x]      []  Type:                  Bladder dysfunction  [x]      [] Cause:    Claudication   [x]      [] Distance:    Previous Amputation  [x]      [] Cause:     Chronic anticoagulation  [x]      [] Indication:   Hinduism  [x]      []      No past medical history on file.  Past Surgical History:   Procedure Laterality Date     UMBILICAL HERNIA REPAIR       Family History   Problem Relation Age of Onset     No Known Problems Mother      Polycystic Kidney Diease Father      Polycystic Kidney Diease Paternal Grandfather      Polycystic Kidney Diease Paternal Aunt      Social History     Socioeconomic History     Marital status: Single     Spouse name: Not on file     Number of children: Not on file     Years of education: Not on file     Highest education level: Not on file   Occupational History     Not on file   Tobacco Use     Smoking status: Never     Smokeless tobacco: Not on file   Substance and Sexual Activity     Alcohol use: Never     Drug use: Never     Sexual activity: Not on file   Other Topics Concern     Not on file   Social History Narrative     Not on file      Social Determinants of Health     Financial Resource Strain: Not on file   Food Insecurity: Not on file   Transportation Needs: Not on file   Physical Activity: Not on file   Stress: Not on file   Social Connections: Not on file   Interpersonal Safety: Not At Risk (5/30/2024)    Received from [facility]    [facility]      Do you feel UNSAFE in any of your personal relationships with your family members or any other acquaintances?: No   Housing Stability: Not on file       ROS:   CONSTITUTIONAL:  No fevers or chills  EYES: negative for icterus  ENT:  negative for hearing loss, tinnitus and sore throat  RESPIRATORY:  negative for cough, sputum, dyspnea  CARDIOVASCULAR:  negative for chest pain. Positive for Fatigue  Renal Insufficiency  GASTROINTESTINAL:  negative for nausea, vomiting, diarrhea or constipation  GENITOURINARY:  negative for incontinence, dysuria, bladder emptying problems  HEME:  No easy bruising  INTEGUMENT:  negative for rash and pruritus  NEURO:  Negative for headache, seizure disorder  Allergies:   No Known Allergies  Medications:  Prescription Medications as of 10/17/2024         Rx Number Disp Refills Start End Last Dispensed Date Next Fill Date Owning Pharmacy    aspirin (ASA) 81 MG chewable tablet  -- -- 2/26/2024 --       Sig: Take 1 tablet by mouth.    Class: Historical    Route: Oral    B Complex-C-Zn-Folic Acid (DIALYVITE/ZINC) TABS  -- -- 9/10/2022 --       Sig: Take 1 tablet by mouth    Class: Historical    Route: Oral    cinacalcet (SENSIPAR) 30 MG tablet  -- -- 4/13/2023 --       Sig: Take 1 tablet by mouth daily at 2 pm    Class: Historical    Route: Oral    gentamicin (GARAMYCIN) 0.1 % external cream  -- -- 4/6/2023 --       Sig: Apply topically.    Class: Historical    Route: Topical    metoprolol tartrate (LOPRESSOR) 50 MG tablet  -- --  --       Sig: Take 50 mg by mouth 2 times daily.    Class: Historical    Route: Oral    potassium  "chloride ER (MICRO-K) 10 MEQ CR capsule  -- -- 6/10/2024 --       Sig: Take by mouth.    Class: Historical    Route: Oral    sevelamer carbonate (RENVELA) 800 MG tablet  -- -- 3/25/2023 --       Sig: TAKE 4 TABLETS BY MOUTH THREE TIMES DAILY WITH MEALS    Class: Historical          Exam:     BP (!) 144/90   Pulse 100   Ht 1.79 m (5' 10.47\")   Wt 92.1 kg (203 lb)   SpO2 98%   BMI 28.74 kg/m    Appearance: in no apparent distress.   Skin: normal  Eyes:  no redness or discharge.  Sclera anicteric  Head and Neck: Normal, no rashes or jaundice  Respiratory: easy respirations, no audible wheezing.  Abdomen: rounded, Surgical scars consistent with history.  PKD not overly large and room available    Extremities: femoral 2+/2+, Edema, none  Neuro: without deficit   Psychiatric: Normal mood and affect    Diagnostics:   Recent Results (from the past 672 hour(s))   EKG 12-lead, tracing only [EKG1]    Collection Time: 10/17/24  2:12 PM   Result Value Ref Range    Systolic Blood Pressure  mmHg    Diastolic Blood Pressure  mmHg    Ventricular Rate 80 BPM    Atrial Rate 80 BPM    TX Interval 186 ms    QRS Duration 96 ms     ms    QTc 479 ms    P Axis 10 degrees    R AXIS 86 degrees    T Axis 28 degrees    Interpretation ECG       Sinus rhythm  Possible Inferior infarct , age undetermined  Abnormal ECG  No previous ECGs available     Echocardiogram Complete    Collection Time: 10/17/24  2:20 PM   Result Value Ref Range    LVEF  55-60%    Partial thromboplastin time [LAB56]    Collection Time: 10/17/24  2:40 PM   Result Value Ref Range    aPTT 34 22 - 38 Seconds   INR [GFX3939]    Collection Time: 10/17/24  2:40 PM   Result Value Ref Range    INR 1.10 0.85 - 1.15   ABO and Rh    Collection Time: 10/17/24  2:40 PM   Result Value Ref Range    SPECIMEN EXPIRATION DATE [id number]    CBC with platelets and differential    Collection Time: 10/17/24  2:40 PM   Result Value Ref Range    WBC Count 13.2 (H) 4.0 - 11.0 10e3/uL " "   RBC Count 5.72 4.40 - 5.90 10e6/uL    Hemoglobin 14.4 13.3 - 17.7 g/dL    Hematocrit 47.8 40.0 - 53.0 %    MCV 84 78 - 100 fL    MCH 25.2 (L) 26.5 - 33.0 pg    MCHC 30.1 (L) 31.5 - 36.5 g/dL    RDW 14.6 10.0 - 15.0 %    Platelet Count 486 (H) 150 - 450 10e3/uL    % Neutrophils 76 %    % Lymphocytes 15 %    % Monocytes 7 %    % Eosinophils 1 %    % Basophils 1 %    % Immature Granulocytes 0 %    NRBCs per 100 WBC 0 <1 /100    Absolute Neutrophils 10.0 (H) 1.6 - 8.3 10e3/uL    Absolute Lymphocytes 2.0 0.8 - 5.3 10e3/uL    Absolute Monocytes 0.9 0.0 - 1.3 10e3/uL    Absolute Eosinophils 0.1 0.0 - 0.7 10e3/uL    Absolute Basophils 0.2 0.0 - 0.2 10e3/uL    Absolute Immature Granulocytes 0.1 <=0.4 10e3/uL    Absolute NRBCs 0.0 10e3/uL   Adult Type and Screen    Collection Time: 10/17/24  2:53 PM   Result Value Ref Range    SPECIMEN EXPIRATION DATE [id number]      No results found for: \"CPRA\"       Again, thank you for allowing me to participate in the care of your patient.        Sincerely,        Linda Simons NP  "

## 2024-10-17 NOTE — PROGRESS NOTES
"Kidney Transplant Evaluation     Participants were informed of the benefits of transplant as well as potential risks such as infection, cancer, and death.  The need for total adherence with immunosuppression medications and following transplant regimens was stressed.  The overall evaluation/approval/listing process was reviewed.        The patient was provided with the following documents:  What You Need to Know About a Kidney Transplant  Adult Kidney Transplant - A Guide for Patients  SRTR Data Sheet - Kidney  Brochure - Kidney Allocation  Brochure - Multiple Listing and Waiting Time Transfer  What Every Patient Needs to Know (UNOS)  UNOS Facts and Figures  Finding a Donor  My Transplant Place - Quick Start Guide  KDPI Consent  Receipt of Information form    Signed the  Receipt of Information for Organ Transplant Recipient.\" He was provided transplant coordinator's business card and instructed to call with additional questions.      Summary    Team s concerns/comments: BMI is okay for transplant, needs CT a/p reviewed, needs brain MRA, will need risk assessment, hematology consult.    Candidacy category: Yellow    Action/Plan: Discuss at Committee     Expected Selection Meeting Discussion: 10/23/24   "

## 2024-10-17 NOTE — PROGRESS NOTES
Psychosocial Assessment  Patient Name/ Age: Robert Alarcon 24 year old   Medical Record #: [id number]  Duration of Interview: 40 min  Process:   Face-to-Face Interview                (counseling < 50%)   Present at Appointment: Daniela (mother), Luz Maria (friend)    Mother provided all information in assessment today as pt is primarily nonverbal specific to autism spectrum disorder dx. Pt was present for entirety of visit and engaged through eye contact and echolalia. Mother was provided guardianship resources for future medical care.            : AARON Evans, GUILLE Date:  October 17, 2024        Type of transplant: Kidney    Donor type:      Cadaver and parent (mother)   Prior Transplants:    No Status of Transplant: N/A       Current Living Situation    Location:   [address] [place] [id number]  With Whom:  Daniela (mother) and two adult brothers       Family/ Social Support:    Primary support system identified as Tonio's mother Daniela, Daniela's parents (live two blocks away), and his adult brothers.  Available, helpful   Committed relationship:     Single   Other supports:   Tonio also receives support from his aunts (friends of Daniela). Available, occasional       Activities/ Functional Ability    Current level: Ambulatory    dependent with ADL's:Bathing, Dressing, Brushing teeth/hair (minimal prompting only), Eating (minimal prompting only), Cooking, Cleaning, Grocery Shopping, Medication Management, Finances    Daniela is Tonio's PCA worker and supports him with his dependent ADLs.      Transportation other: Daniela       Vocational/Employment/Financial     Employment   disabled   Job Description      Income   SSDI (not specific to kidney failure)     Insurance      At this time, patient can afford medication costs:  Yes  [facility] PMAP OOS       Medical Status    Current mode of treatment for ESRD Dialysis (per Daniela since 04/23)   Complications None       Behavioral    Tobacco Use  "No Chemical Dependency No   Daniela reports Tonio has never used tobacco products.  Daniela reports that Tonio does not drink alcohol or use drugs. She notes that he has never completed chemical dependency programming.      Psychiatric Impairment No  Daniela reports Tonio has a diagnosis of Autism Spectrum Disorder. He primarily engages through echolalia, prompted common speech (\"Are you Tonio?\" \"That's me\" ; \"How are you?\" \"Good\"), and eye contact. She notes that he experiences bouts of depression and anxiety that have been consistent since childhood. Daniela denied any hospitalizations relating to mental health, SI attempts, or medication usage to manage symptoms. She notes that Tonio attempted therapy in the past though did not find it to be helpful. Daniela reports that Trinos mental health is well managed at this time.     Reading ability Poor  Education Level: [employer] schooling completed post hs (19 yo-22 yo) Recent Legal History No      Coping Style/Strategies: mimicked affection (not via touch, primarily through smiling and praise), comfort food ([employer]), rest     Ability to Adhere to Complex Medical Regime: Yes     Adherence History: Daniela identified no concerns regarding Trinos ability to attend drs appointments, take medication on time, follow up as needed, etc.         Education  _X_ Medicare  _X_ Rehabilitation  _X_ Donor issues  _X_ Community resources  _X_ Post discharge housing  _X_ Financial resources  _X_ Medical insurance options  _X_ Psych adjustment  _X_ Family adjustment  _X_ Health Care Directive - Daniela reports that she is not currently Tonio's guardian. MSW provided education and resources specific to petitioning for guardianship. Daniela reports that she has tried to do so in the past ([facility]) though the process was long and tedious. She notes that she does not see a need for guardianship as she notes he can sign his name and she will always be present to advocate for him. MSW asked " Daniela if Tonio can understand the meaning behind signing off on a HCD and Daniela said he would not be able to cognitively understand this. MSW also verbalized concerns for Daniela's ability to be involved in Tonio's care if she were to become a living donor. Daniela verbalized understanding and thanked MSW for resources specific to petitioning for guardianship.   Psychosocial Risks of Transplant Reviewed and Discussed:  _X_ Increased stress related to emotional,            family, social, employment or financial           situation  _X_ Affect on work and/or disability benefits  _X_ Affect on future life insurance  _X_ Transplant outcome expectations may           not be met  _X_ Mental Health Risks: anxiety,           depression, PTSD, guilt, grief and           chronic fatigue     Notable Items:   See writer's note regarding guardianship concerns above under HCD.      Final Evaluation/Assessment   Patient seemed to process information well. Appeared well informed, motivated and able to follow post transplant requirements. Behavior was appropriate during interview. Has adequate income and insurance coverage. Adequate social support. No major contraindications noted for transplant.  At this time patient appears to understand the risks and benefits of transplant.      Recommendation  Conditional  - Mother to petition for guardianship prior to transplant   Selection Criteria Met:  Plan for support Yes   Chemical Dependence Yes   Smoking Yes   Mental Health Yes   Adequate Finances Yes    Signature: AARON Evans, LGSW   Title: Clinical

## 2024-10-17 NOTE — PROGRESS NOTES
Transplant Surgery Consult Note     Medical record number: [id number]  YOB: 2000,   Consult requested  for evaluation of kidney transplant candidacy.    Assessment and Recommendations:Mr. Alarcon appears to be a good candidate for kidney transplantation and has a good understanding of the risks and benefits of this approach to the management of renal failure. The following issues should be addressed prior to finalizing his transplant candidacy:     Transplant order: Mr. Alarcon has End stage renal failure due to polycystic kidney disease (PKD) whose condition is not expected to resolve, is expected to progress, and is expected to continue to develop related comorbid conditions.  Recommend he be considered as a candidate for kidney.  Cardiology consult for cardiac risk stratification to be ordered: Yes  CT abdomen and pelvis without contrast to be ordered for assessment of vascular targets: Yes  Transplant listing labs ordered to include HLA, ABOx2, Cr, etc.  Dietician consult ordered: Yes  Social work consult ordered: Yes  Imaging reports reviewed:  yes  Radiology images reviewed:yes  Recipient suitable to move forward with work up of living donors:  Yes      -Needs brain MRA  -ABO: AB +  -PKD- reviewed by surgeon and states there is room.   -Needs cardiac clearance: HFrEF, cardiomyopathy: ECHO 8/22 pre transplant-severely enlarged Left ventrical. LVEF 28% with severe hypokinesis. Improved to 35-40% in 2023.   -H/o Polycythemia  No evidence of underlying myeloproliferative disorder. Opted for Aspirin 81mg. Last seen Hematology 2/2024. Would recommend hematology clearance.     Umbilical hernia repair with mesh in 5/2024.        The majority of our visit was spent in counselling, discussing the medical and surgical risks of kidney transplantation. We discussed approximate wait time and how that is influenced by issues such as blood type and sensitization (PRA) and access to a living donor. I contrasted  potential waiting time for living vs  donor kidneys from  normal (0-85%) or higher (%) kidney donor profile index (KDPI) donors and their associated outcomes. I would not recommend this individual to consider kidneys from high KDPI donors. The reason for this decision is best summarized as: multiple potential living donors. Potential surgical complications of kidney transplantation include bleeding, superficial or deep wound complications (infection, hernia, lymphocele), ureteral anastomotic failure (leak or stenosis), graft thrombosis, need for reoperation and other issues such as cardiac complications, pneumonia, deep venous thrombosis, pulmonary embolism, post transplant diabetes and death. The potential for recurrent disease or need for retransplantation was also addressed. We discussed the possible need for ureteral stent (and subsequent removal), and the utility of protocol biopsy and laboratory studies to evaluate for rejection or recurrent disease. We discussed the risk of graft rejection, our center's average graft and patient survival rates, immunosuppression protocols, as well as the potential opportunity to participate in clinical trials.  We also discussed the average length of stay, recovery process, and posttransplant lab and monitoring protocol.  I emphasized the need for strict immunosuppression medication adherence and the potential for complications of immunosuppression such as skin cancer or lymphoma, as well as a very low but not zero risk of donor-derived disease transmission risks (infection, cancer). Mr. Alarcon asked good questions and his candidacy will be reviewed at our Multidisciplinary Selection Committee. Thank you for the opportunity to participate in Mr. Alarcon's care.      Total time: 60 minutes  Counselling time: 40 minutes        ---------------------------------------------------------------------------------------------------    HPI: Mr. Alarcon has End stage renal  failure due to polycystic kidney disease (PKD). The patient is non-diabetic.       The patient is on dialysis.    Has potential kidney donors:  Yes .  Interested in participation in paired exchange if a donor is willing: Yes     The patient has the following pertinent history:       No    Yes  Dialysis:    []      [x] via:    PD    Blood Transfusion                  []      [x]  Number of units: unsure  Most recently: unsure  Pregnancy:    [x]      [] Number:       Previous Transplant:  [x]      [] Details:    Cancer    [x]      [] Comment:   Kidney stones   [x]      [] Comment:      Recurrent infections  [x]      []  Type:                  Bladder dysfunction  [x]      [] Cause:    Claudication   [x]      [] Distance:    Previous Amputation  [x]      [] Cause:     Chronic anticoagulation  [x]      [] Indication:   Sabianist  [x]      []      No past medical history on file.  Past Surgical History:   Procedure Laterality Date    UMBILICAL HERNIA REPAIR       Family History   Problem Relation Age of Onset    No Known Problems Mother     Polycystic Kidney Diease Father     Polycystic Kidney Diease Paternal Grandfather     Polycystic Kidney Diease Paternal Aunt      Social History     Socioeconomic History    Marital status: Single     Spouse name: Not on file    Number of children: Not on file    Years of education: Not on file    Highest education level: Not on file   Occupational History    Not on file   Tobacco Use    Smoking status: Never    Smokeless tobacco: Not on file   Substance and Sexual Activity    Alcohol use: Never    Drug use: Never    Sexual activity: Not on file   Other Topics Concern    Not on file   Social History Narrative    Not on file     Social Determinants of Health     Financial Resource Strain: Not on file   Food Insecurity: Not on file   Transportation Needs: Not on file   Physical Activity: Not on file   Stress: Not on file   Social Connections: Not on file   Interpersonal Safety:  Not At Risk (5/30/2024)    Received from  and [facility]     IP Custom IPV     Do you feel UNSAFE in any of your personal relationships with your family members or any other acquaintances?: No   Housing Stability: Not on file       ROS:   CONSTITUTIONAL:  No fevers or chills  EYES: negative for icterus  ENT:  negative for hearing loss, tinnitus and sore throat  RESPIRATORY:  negative for cough, sputum, dyspnea  CARDIOVASCULAR:  negative for chest pain. Positive for Fatigue  Renal Insufficiency  GASTROINTESTINAL:  negative for nausea, vomiting, diarrhea or constipation  GENITOURINARY:  negative for incontinence, dysuria, bladder emptying problems  HEME:  No easy bruising  INTEGUMENT:  negative for rash and pruritus  NEURO:  Negative for headache, seizure disorder  Allergies:   No Known Allergies  Medications:  Prescription Medications as of 10/17/2024         Rx Number Disp Refills Start End Last Dispensed Date Next Fill Date Owning Pharmacy    aspirin (ASA) 81 MG chewable tablet  -- -- 2/26/2024 --       Sig: Take 1 tablet by mouth.    Class: Historical    Route: Oral    B Complex-C-Zn-Folic Acid (DIALYVITE/ZINC) TABS  -- -- 9/10/2022 --       Sig: Take 1 tablet by mouth    Class: Historical    Route: Oral    cinacalcet (SENSIPAR) 30 MG tablet  -- -- 4/13/2023 --       Sig: Take 1 tablet by mouth daily at 2 pm    Class: Historical    Route: Oral    gentamicin (GARAMYCIN) 0.1 % external cream  -- -- 4/6/2023 --       Sig: Apply topically.    Class: Historical    Route: Topical    metoprolol tartrate (LOPRESSOR) 50 MG tablet  -- --  --       Sig: Take 50 mg by mouth 2 times daily.    Class: Historical    Route: Oral    potassium chloride ER (MICRO-K) 10 MEQ CR capsule  -- -- 6/10/2024 --       Sig: Take by mouth.    Class: Historical    Route: Oral    sevelamer carbonate (RENVELA) 800 MG tablet  -- -- 3/25/2023 --       Sig: TAKE 4 TABLETS BY MOUTH THREE TIMES DAILY WITH MEALS     "Class: Historical          Exam:     BP (!) 144/90   Pulse 100   Ht 1.79 m (5' 10.47\")   Wt 92.1 kg (203 lb)   SpO2 98%   BMI 28.74 kg/m    Appearance: in no apparent distress.   Skin: normal  Eyes:  no redness or discharge.  Sclera anicteric  Head and Neck: Normal, no rashes or jaundice  Respiratory: easy respirations, no audible wheezing.  Abdomen: rounded, Surgical scars consistent with history.  PKD not overly large and room available    Extremities: femoral 2+/2+, Edema, none  Neuro: without deficit   Psychiatric: Normal mood and affect    Diagnostics:   Recent Results (from the past 672 hour(s))   EKG 12-lead, tracing only [EKG1]    Collection Time: 10/17/24  2:12 PM   Result Value Ref Range    Systolic Blood Pressure  mmHg    Diastolic Blood Pressure  mmHg    Ventricular Rate 80 BPM    Atrial Rate 80 BPM    IL Interval 186 ms    QRS Duration 96 ms     ms    QTc 479 ms    P Axis 10 degrees    R AXIS 86 degrees    T Axis 28 degrees    Interpretation ECG       Sinus rhythm  Possible Inferior infarct , age undetermined  Abnormal ECG  No previous ECGs available     Echocardiogram Complete    Collection Time: 10/17/24  2:20 PM   Result Value Ref Range    LVEF  55-60%    Partial thromboplastin time [LAB56]    Collection Time: 10/17/24  2:40 PM   Result Value Ref Range    aPTT 34 22 - 38 Seconds   INR [AUR2706]    Collection Time: 10/17/24  2:40 PM   Result Value Ref Range    INR 1.10 0.85 - 1.15   ABO and Rh    Collection Time: 10/17/24  2:40 PM   Result Value Ref Range    SPECIMEN EXPIRATION DATE [id number]    CBC with platelets and differential    Collection Time: 10/17/24  2:40 PM   Result Value Ref Range    WBC Count 13.2 (H) 4.0 - 11.0 10e3/uL    RBC Count 5.72 4.40 - 5.90 10e6/uL    Hemoglobin 14.4 13.3 - 17.7 g/dL    Hematocrit 47.8 40.0 - 53.0 %    MCV 84 78 - 100 fL    MCH 25.2 (L) 26.5 - 33.0 pg    MCHC 30.1 (L) 31.5 - 36.5 g/dL    RDW 14.6 10.0 - 15.0 %    Platelet Count 486 (H) 150 - 450 " "10e3/uL    % Neutrophils 76 %    % Lymphocytes 15 %    % Monocytes 7 %    % Eosinophils 1 %    % Basophils 1 %    % Immature Granulocytes 0 %    NRBCs per 100 WBC 0 <1 /100    Absolute Neutrophils 10.0 (H) 1.6 - 8.3 10e3/uL    Absolute Lymphocytes 2.0 0.8 - 5.3 10e3/uL    Absolute Monocytes 0.9 0.0 - 1.3 10e3/uL    Absolute Eosinophils 0.1 0.0 - 0.7 10e3/uL    Absolute Basophils 0.2 0.0 - 0.2 10e3/uL    Absolute Immature Granulocytes 0.1 <=0.4 10e3/uL    Absolute NRBCs 0.0 10e3/uL   Adult Type and Screen    Collection Time: 10/17/24  2:53 PM   Result Value Ref Range    SPECIMEN EXPIRATION DATE [id number]      No results found for: \"CPRA\"   "

## 2024-10-17 NOTE — LETTER
10/17/2024      Robert Alarcon  [address] [place] [address]      Dear Colleague,    Thank you for referring your patient, Robert Alarcon, to the [facility]PL[facility]. Please see a copy of my visit note below.    TRANSPLANT NEPHROLOGY RECIPIENT EVALUATION NOTE    Assessment and Plan:  # Kidney Transplant Evaluation: Patient is a good candidate overall. Benefits of a living donor transplant were discussed.    Recommendations:  -Cardiac risk assessment  -Hematology clearance  -MRA brain due to history of PCKD  - input on support, guardianship  -Hematuria, culture negative. Recommend repeat UA  -Dental  -Encourage mobility    # ESKD from polycystic kidney disease (PKD): diagnosed in 2019 with a CT urogram. (Paternal side). He was lost to follow up until 8/2022 and was found to be in renal failure. He was initiated on hemodialysis at that time and has transtioned to peritoneal dialysis.  He is doing ok on Peritoneal dialysis, but would likely benfit from a kidney transplant. Mother denies abdominal pain or recurrent UTIs due to cysts.     # Cardiac Risk:    #HFpEF, cardiomyopathy: ECHO 8/22 (prior to dialysis ) severely enlarged Left ventrical. LVEF 28% with severe hypokinesis. Improved to 35-40% in 2/2023, now 55-60% today. Follows with Cardiology at [facility], last seen 5/2023.    # PAD Screening: imaging CT 5/24 to be reviewed by surgeon.    # Polycythemia due to PCKD: No evidence of underlying myeloproliferative disorder. Evaluated by Hematology 2/24 who recommended Phlebotomy and Aspirin 81mg. Patients mom opted for aspirin only.     # Autism/Developmental delay:  limited verbal capacity. Mother is not guardian. Appreciate  input on support.     # social support: previous transplant referral closed due to no show appointments x2.      # Health Maintenance: Dental: Not up to date    - Discussed the risks and benefits of a transplant, including the risk of surgery and immunosuppression  medications.  Patient's overall evaluation will be discussed in the Transplant Program's regular meeting with a final recommendation on the patients suitability for transplant to be made at that time.    Pending completion of the full evaluation, patient presently appears to be enough of an acceptable kidney transplant recipient candidate to have any potential kidney donors start the evaluation process.      Evaluation:  Robert Alarcon was seen in consultation at the request of Linda Sharp NP  for evaluation as a potential kidney transplant recipient.    Reason for Visit:  Robert Alarcon is a 24 year old male with ESKD from polycystic kidney disease (PKD), who presents for kidney transplant evaluation.    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 He has mostly nonverbal autism. Most HPI provided by mother and electronic records. In 2019 he developed hematuria and a CT urogram demonstrated marked polycystic kidneys and lost to follow up. He was brought to [place] by his mom for complaints of limping. Blood work revealed SCr of 18 and he was admitted for acute on chronic renal failure and started on dialysis. Referred to [place] for Transplant in 10/22 but is not currently being considered due to no show appointments x2, most recently in 2023 .         Kidney Disease Hx:        2019       Kidney Disease Dx: Polycystic kidney disease (PKD)       Biopsy Proven: No         On Dialysis: Yes, Date initiated: 2022 and Dialysis Type: PD; Switched in 2023       Primary Nephrologist: Joshua       H/o Kidney Stones: No       H/o Recurrent/Frequent UTI: No         Diabetic Hx: None           Cardiac/Vascular Disease Risk Factors:        Cardiac Risk Factors: CKD       Known CAD: No        Known PAD/Caludication Symptoms: No       Known Heart Failure: HFpEF       Arrhythmia: No       Pulmonary Hypertension: No       Valvular Disease: No       Other: None         Viral Serology Status       CMV IgG Antibody: Positive       EBV IgG Antibody: Negative         Volume Status/Weight:        Volume status: Mildly hypervolemic       Weight:  Acceptable BMI       BMI: There is no height or weight on file to calculate BMI.         Functional Capacity/Frailty:         Mom is PCA 40hrs every 2 weeks. Mom helps with ADLs. Reports he toilets himself.  She does his dialysis and medications.  She reports he can be mobile but often lives a sedentary life with interests in electronic devices/games.      Fatigue/Decreased Energy: [x] No [] Yes    Chest Pain or SOB with Exertion: [x] No [] Yes    Significant Weight Change: [x] No [] Yes    Nausea, Vomiting or Diarrhea: [] No [x] Yes  Vomiting occasionally after sessions   Fever, Sweats or Chills:  [x] No [] Yes    Leg Swelling [x] No [] Yes        History of Cancer: None    Other Significant Medical Issues:   #Severe sepsis: 5/23: source unknown, possible bacteremic. Needed hospitalization.    Allergy Testing Questions:   Medication that caused a reaction None   Antibiotics used that didn't give an allergic reaction?  Patient doesn't know    COVID Vaccination Up To Date: Yes    Potential Living Kidney Donors: Yes    Review of Systems:  A comprehensive review of systems was obtained and negative, except as noted in the HPI or PMH.    Past Medical History:   Medical record was reviewed and PMH was discussed with patient and noted below.  Past Medical History:   Diagnosis Date     ESRD (end stage renal disease) on dialysis (H)      PCK (polycystic kidney disease)      Peritoneal dialysis catheter in place (H)        Past Social History:   Past Surgical History:   Procedure Laterality Date     UMBILICAL HERNIA REPAIR       Personal history of bleeding or anesthesia  problems: No    Family History:  Family History   Problem Relation Age of Onset     No Known Problems Mother      Polycystic Kidney Diease Father      Polycystic Kidney Diease Paternal Grandfather      Polycystic Kidney Diease Paternal Aunt        Personal History:   Social History     Socioeconomic History     Marital status: Single     Spouse name: Not on file     Number of children: Not on file     Years of education: Not on file     Highest education level: Not on file   Occupational History     Not on file   Tobacco Use     Smoking status: Never     Smokeless tobacco: Not on file   Substance and Sexual Activity     Alcohol use: Never     Drug use: Never     Sexual activity: Not on file   Other Topics Concern     Not on file   Social History Narrative     Not on file     Social Determinants of Health     Financial Resource Strain: Not on file   Food Insecurity: Not on file   Transportation Needs: Not on file   Physical Activity: Not on file   Stress: Not on file   Social Connections: Not on file   Interpersonal Safety: Not At Risk (5/30/2024)    [facility]    [facility] Custom IPV      Do you feel UNSAFE in any of your personal relationships with your family members or any other acquaintances?: No   Housing Stability: Not on file       Allergies:  No Known Allergies    Medications:  Current Outpatient Medications   Medication Sig Dispense Refill     aspirin (ASA) 81 MG chewable tablet Take 1 tablet by mouth.       B Complex-C-Zn-Folic Acid (DIALYVITE/ZINC) TABS Take 1 tablet by mouth       cinacalcet (SENSIPAR) 30 MG tablet Take 1 tablet by mouth daily at 2 pm       gentamicin (GARAMYCIN) 0.1 % external cream Apply topically.       metoprolol tartrate (LOPRESSOR) 50 MG tablet Take 50 mg by mouth 2 times daily.       potassium chloride ER (MICRO-K) 10 MEQ CR capsule Take by mouth.       sevelamer carbonate (RENVELA) 800 MG tablet TAKE 4 TABLETS BY MOUTH THREE TIMES  DAILY WITH MEALS       No current facility-administered medications for this visit.       Vitals:  There were no vitals taken for this visit.    Exam:  GENERAL APPEARANCE: alert and no distress  HENT: mouth without ulcers or lesions  RESP: lungs clear to auscultation - no rales, rhonchi or wheezes  CV: regular rhythm, normal rate, no rub, no murmur  EDEMA: no LE edema bilaterally  ABDOMEN: soft, nondistended, nontender, bowel sounds normal  MS: extremities normal - no gross deformities noted, no evidence of inflammation in joints, no muscle tenderness  SKIN: no rash  DIALYSIS ACCESS:  Peritoneal dialysis catheter    Results:   Recent Results (from the past 336 hour(s))   EKG 12-lead, tracing only [EKG1]    Collection Time: 10/17/24  2:12 PM   Result Value Ref Range    Systolic Blood Pressure  mmHg    Diastolic Blood Pressure  mmHg    Ventricular Rate 80 BPM    Atrial Rate 80 BPM    NC Interval 186 ms    QRS Duration 96 ms     ms    QTc 479 ms    P Axis 10 degrees    R AXIS 86 degrees    T Axis 28 degrees    Interpretation ECG       Sinus rhythm  Possible Inferior infarct , age undetermined  Abnormal ECG  No previous ECGs available  Confirmed by MD TERE, TRAM ([id number]) on 10/19/2024 11:21:17 PM     Echocardiogram Complete    Collection Time: 10/17/24  2:20 PM   Result Value Ref Range    LVEF  55-60%    UA with Microscopic reflex to Culture    Collection Time: 10/17/24  2:39 PM    Specimen: Urine, NOS   Result Value Ref Range    Color Urine Dark Brown (A) Colorless, Straw, Light Yellow, Yellow    Appearance Urine Cloudy (A) Clear    Glucose Urine Negative Negative mg/dL    Bilirubin Urine Negative Negative    Ketones Urine Negative Negative mg/dL    Specific Gravity Urine 1.012 1.003 - 1.035    Blood Urine Large (A) Negative    pH Urine 7.5 (H) 5.0 - 7.0    Protein Albumin Urine 200 (A) Negative mg/dL    Urobilinogen Urine Normal Normal, 2.0 mg/dL    Nitrite Urine Negative Negative    Leukocyte Esterase Urine  Moderate (A) Negative    Amorphous Crystals Urine Few (A) None Seen /HPF    RBC Urine >182 (H) <=2 /HPF    WBC Urine 38 (H) <=5 /HPF    Squamous Epithelials Urine 2 (H) <=1 /HPF   Urine Culture    Collection Time: 10/17/24  2:39 PM    Specimen: Urine, NOS   Result Value Ref Range    Culture <10,000 CFU/mL Mixture of Urogenital Connie    Varicella Zoster Virus Antibody IgG [[id number]]    Collection Time: 10/17/24  2:40 PM   Result Value Ref Range    Varicella Zoster Virus Antibody IgG Interpretation Positive     Varicella Zoster Virus Antibody IgG Instrument Value 3.17 <1.00 S/CO   Treponema Abs w Reflex to RPR and Titer [[id number]]    Collection Time: 10/17/24  2:40 PM   Result Value Ref Range    Treponema Antibody Total Nonreactive Nonreactive   HIV Antigen Antibody Combo Pretransplant Cascade    Collection Time: 10/17/24  2:40 PM   Result Value Ref Range    HIV Antigen Antibody Combo Pretransplant Nonreactive Nonreactive   Hepatitis C antibody [[id number]]    Collection Time: 10/17/24  2:40 PM   Result Value Ref Range    Hepatitis C Antibody Nonreactive Nonreactive   Hepatitis B Surface Antibody [[id number]]    Collection Time: 10/17/24  2:40 PM   Result Value Ref Range    Hepatitis B Surface Antibody Reactive     Hepatitis B Surface Antibody Instrument Value 355.00 <8.5 m[IU]/mL   Hepatitis B surface antigen [[id number]]    Collection Time: 10/17/24  2:40 PM   Result Value Ref Range    Hepatitis B Surface Antigen Nonreactive Nonreactive   Hepatitis B core antibody [[id number]]    Collection Time: 10/17/24  2:40 PM   Result Value Ref Range    Hepatitis B Core Antibody Total Nonreactive Nonreactive   EBV Capsid Antibody IgG [[id number]]    Collection Time: 10/17/24  2:40 PM   Result Value Ref Range    EBV Capsid Lauren IgG Instrument Value <10.0 <18.0 U/mL    EBV Capsid Antibody IgG No detectable antibody. No detectable antibody.   CMV Antibody IgG [[id number]]    Collection Time: 10/17/24  2:40 PM   Result Value Ref Range    CMV Lauren IgG  Instrument Value 3.20 (H) <0.60 U/mL    CMV Antibody IgG Positive, suggests recent or past exposure. (A) No detectable antibody.    Thrombin time [[id number]]    Collection Time: 10/17/24  2:40 PM   Result Value Ref Range    Thrombin Time 17.8 13.0 - 19.0 Seconds   Partial thromboplastin time [LAB56]    Collection Time: 10/17/24  2:40 PM   Result Value Ref Range    aPTT 34 22 - 38 Seconds   Lupus Anticoagulant Panel [[id number]]    Collection Time: 10/17/24  2:40 PM   Result Value Ref Range    PTT Ratio 1.28 <1.30    DRVVT Screen Ratio 0.99 <1.08    Lupus Result Negative Negative    Lupus Interpretation       The INR is normal.  APTT ratio is normal.    DRVVT Screen ratio is normal.  Thrombin time is normal.  NEGATIVE TEST; A LUPUS ANTICOAGULANT WAS NOT DETECTED IN THIS SPECIMEN WITHIN THE LIMITS OF THE TESTING REPERTOIRE.  If the clinical picture is strongly suggestive of an antiphospholipid syndrome, recommend anticardiolipin and beta-2-glycoprotein (IgG and IgM) antibody tests.    Deana Dillard MD, PhD  [facility]      Signout Location if Remote Report signed out at: [email or web address]    INR [[id number]]    Collection Time: 10/17/24  2:40 PM   Result Value Ref Range    INR 1.10 0.85 - 1.15   Cardiolipin Lauren IgG and IgM [LAB 6836]    Collection Time: 10/17/24  2:40 PM   Result Value Ref Range    Cardiolipin Lauren IgG Instrument Value <2.0 <10.0 GPL-U/mL    Cardiolipin Antibody IgG Negative Negative    Cardiolipin Lauren IgM Instrument Value 2.5 <10.0 MPL-U/mL    Cardiolipin Antibody IgM Negative Negative   ABO and Rh    Collection Time: 10/17/24  2:40 PM   Result Value Ref Range    ABO/RH(D) AB POS     SPECIMEN EXPIRATION DATE [id number]    Comprehensive metabolic panel [LAB17]    Collection Time: 10/17/24  2:40 PM   Result Value Ref Range    Sodium 142 135 - 145 mmol/L    Potassium 3.0 (L) 3.4 - 5.3 mmol/L    Carbon Dioxide (CO2) 28 22 - 29 mmol/L    Anion Gap 19 (H) 7 - 15 mmol/L    Urea Nitrogen 17.5 6.0 - 20.0 mg/dL     Creatinine 12.80 (H) 0.67 - 1.17 mg/dL    GFR Estimate 5 (L) >60 mL/min/1.73m2    Calcium 8.7 (L) 8.8 - 10.4 mg/dL    Chloride 95 (L) 98 - 107 mmol/L    Glucose 98 70 - 99 mg/dL    Alkaline Phosphatase 175 (H) 40 - 150 U/L    AST 10 0 - 45 U/L    ALT 6 0 - 70 U/L    Protein Total 8.0 6.4 - 8.3 g/dL    Albumin 4.3 3.5 - 5.2 g/dL    Bilirubin Total 0.3 <=1.2 mg/dL   Quantiferon TB Gold Plus Grey Tube    Collection Time: 10/17/24  2:40 PM    Specimen: Arm, Right; Blood   Result Value Ref Range    Quantiferon Nil Tube 0.03 IU/mL   Quantiferon TB Gold Plus Green Tube    Collection Time: 10/17/24  2:40 PM    Specimen: Arm, Right; Blood   Result Value Ref Range    Quantiferon TB1 Tube 0.02 IU/mL   Quantiferon TB Gold Plus Yellow Tube    Collection Time: 10/17/24  2:40 PM    Specimen: Arm, Right; Blood   Result Value Ref Range    Quantiferon TB2 Tube 0.04    Quantiferon TB Gold Plus Purple Tube    Collection Time: 10/17/24  2:40 PM    Specimen: Arm, Right; Blood   Result Value Ref Range    Quantiferon Mitogen 10.00 IU/mL   CBC with platelets and differential    Collection Time: 10/17/24  2:40 PM   Result Value Ref Range    WBC Count 13.2 (H) 4.0 - 11.0 10e3/uL    RBC Count 5.72 4.40 - 5.90 10e6/uL    Hemoglobin 14.4 13.3 - 17.7 g/dL    Hematocrit 47.8 40.0 - 53.0 %    MCV 84 78 - 100 fL    MCH 25.2 (L) 26.5 - 33.0 pg    MCHC 30.1 (L) 31.5 - 36.5 g/dL    RDW 14.6 10.0 - 15.0 %    Platelet Count 486 (H) 150 - 450 10e3/uL    % Neutrophils 76 %    % Lymphocytes 15 %    % Monocytes 7 %    % Eosinophils 1 %    % Basophils 1 %    % Immature Granulocytes 0 %    NRBCs per 100 WBC 0 <1 /100    Absolute Neutrophils 10.0 (H) 1.6 - 8.3 10e3/uL    Absolute Lymphocytes 2.0 0.8 - 5.3 10e3/uL    Absolute Monocytes 0.9 0.0 - 1.3 10e3/uL    Absolute Eosinophils 0.1 0.0 - 0.7 10e3/uL    Absolute Basophils 0.2 0.0 - 0.2 10e3/uL    Absolute Immature Granulocytes 0.1 <=0.4 10e3/uL    Absolute NRBCs 0.0 10e3/uL   Extra Purple Top Tube     Collection Time: 10/17/24  2:40 PM   Result Value Ref Range    Hold Specimen JIC    Quantiferon TB Gold Plus    Collection Time: 10/17/24  2:40 PM    Specimen: Arm, Right; Blood   Result Value Ref Range    Quantiferon-TB Gold Plus Negative Negative    TB1 Ag minus Nil Value -0.01 IU/mL    TB2 Ag minus Nil Value 0.01 IU/mL    Mitogen minus Nil Result 9.97 IU/mL    Nil Result 0.03 IU/mL   Blood Group A Subtype    Collection Time: 10/17/24  2:53 PM   Result Value Ref Range    A1 Antigen Type Negative     SPECIMEN EXPIRATION DATE [id number]    Adult Type and Screen    Collection Time: 10/17/24  2:53 PM   Result Value Ref Range    ABO/RH(D) AB POS     Antibody Screen Negative Negative    SPECIMEN EXPIRATION DATE [id number]          I spent a total of 60 minutes on the date of the encounter doing chart review, performing a history and physical exam, completing documentation and any further activities as noted above.      Again, thank you for allowing me to participate in the care of your patient.        Sincerely,        Devora Monge NP

## 2024-10-17 NOTE — PROGRESS NOTES
TRANSPLANT NEPHROLOGY RECIPIENT EVALUATION NOTE    Assessment and Plan:  # Kidney Transplant Evaluation: Patient is a good candidate overall. Benefits of a living donor transplant were discussed.    Recommendations:  -Cardiac risk assessment  -Hematology clearance  -MRA brain due to history of PCKD  - input on support, guardianship  -Hematuria, culture negative. Recommend repeat UA  -Dental  -Encourage mobility    # ESKD from polycystic kidney disease (PKD): diagnosed in 2019 with a CT urogram. (Paternal side).  He was lost to follow up until 8/2022, when he represented he was found to be in renal failure and he was initiated on hemodialysis. He has since transitioned to peritoneal dialysis.  He is doing ok on Peritoneal dialysis, but would likely benfit from a kidney transplant. Mother denies abdominal pain or recurrent UTIs due to cysts.     # Cardiac Risk:    #HFpEF, cardiomyopathy: ECHO 8/22 (prior to dialysis) severely enlarged Left ventrical. LVEF 28% with severe hypokinesis. Improved to 35-40% in 2/2023, now 55-60% today. Follows with Cardiology at [facility], last seen 5/2023.    # PAD Screening: imaging CT 5/24 to be reviewed by surgeon.    # Polycythemia due to PCKD: No evidence of underlying myeloproliferative disorder. Evaluated by Hematology 2/24 who recommended Phlebotomy and Aspirin 81mg. Patients mom opted for aspirin only.       #Leukocytosis chronic: WBC stable 13-14 since at least 2022.      # Autism/Developmental delay: limited verbal capacity. Mother is not guardian. Appreciate  input on support. previous transplant referrals closed due to no show appointments x2.      # Health Maintenance: Dental: Not up to date    - Discussed the risks and benefits of a transplant, including the risk of surgery and immunosuppression medications.  Patient's overall evaluation will be discussed in the Transplant Program's regular meeting with a final recommendation on the patients suitability for transplant to  be made at that time.    Pending completion of the full evaluation, patient presently appears to be enough of an acceptable kidney transplant recipient candidate to have any potential kidney donors start the evaluation process.      Evaluation:  oRbert Alarcon was seen in consultation at the request of Linda Sharp NP  for evaluation as a potential kidney transplant recipient.    Reason for Visit:  Robert Alarcon is a 24 year old male with ESKD from polycystic kidney disease (PKD), who presents for kidney transplant evaluation.    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 He has mostly nonverbal autism. Most HPI provided by mother and electronic records. In  he developed hematuria and a CT urogram demonstrated marked polycystic kidneys and lost to follow up. He was brought to [facility] by his mom for complaints of limping. Blood work revealed SCr of 18 and he was admitted for acute on chronic renal failure and started on dialysis. Referred to [facility] for Transplant in 10/22 but is not currently being considered due to no show appointments x2, most recently in 2023 .         Kidney Disease Hx:        2019       Kidney Disease Dx: Polycystic kidney disease (PKD)       Biopsy Proven: No         On Dialysis: Yes, Date initiated: 2022 and Dialysis Type: PD; Switched in 2023       Primary Nephrologist: Joshua       H/o Kidney Stones: No       H/o Recurrent/Frequent UTI: No         Diabetic Hx: None           Cardiac/Vascular Disease Risk Factors:        Cardiac Risk Factors: CKD       Known CAD: No       Known PAD/Caludication Symptoms: No       Known Heart Failure: HFpEF       Arrhythmia: No       Pulmonary Hypertension: No       Valvular Disease: No       Other: None          Viral Serology Status       CMV IgG Antibody: Positive       EBV IgG Antibody: Negative         Volume Status/Weight:        Volume status: Mildly hypervolemic       Weight:  Acceptable BMI       BMI: There is no height or weight on file to calculate BMI.         Functional Capacity/Frailty:         Mom is PCA 40hrs every 2 weeks. Mom helps with ADLs. Reports he toilets himself.  She does his dialysis and medications.  She reports he can be mobile but often lives a sedentary life with interests in electronic devices/games.      Fatigue/Decreased Energy: [x] No [] Yes    Chest Pain or SOB with Exertion: [x] No [] Yes    Significant Weight Change: [x] No [] Yes    Nausea, Vomiting or Diarrhea: [] No [x] Yes  Vomiting occasionally after sessions   Fever, Sweats or Chills:  [x] No [] Yes    Leg Swelling [x] No [] Yes        History of Cancer: None    Other Significant Medical Issues:   #Severe sepsis: 5/23: source unknown, possible bacteremic. Needed hospitalization.    Allergy Testing Questions:   Medication that caused a reaction None   Antibiotics used that didn't give an allergic reaction?  Patient doesn't know    COVID Vaccination Up To Date: Yes    Potential Living Kidney Donors: Yes    Review of Systems:  A comprehensive review of systems was obtained and negative, except as noted in the HPI or PMH.    Past Medical History:   Medical record was reviewed and PMH was discussed with patient and noted below.  Past Medical History:   Diagnosis Date    ESRD (end stage renal disease) on dialysis (H)     PCK (polycystic kidney disease)     Peritoneal dialysis catheter in place (H)        Past Social History:   Past Surgical History:   Procedure Laterality Date    UMBILICAL HERNIA REPAIR       Personal history of bleeding or anesthesia problems: No    Family History:  Family History   Problem Relation Age of Onset    No Known Problems Mother     Polycystic Kidney Diease Father     Polycystic Kidney Diease Paternal  "Grandfather     Polycystic Kidney Diease Paternal Aunt        Personal History:   Social History     Socioeconomic History    Marital status: Single     Spouse name: Not on file    Number of children: Not on file    Years of education: Not on file    Highest education level: Not on file   Occupational History    Not on file   Tobacco Use    Smoking status: Never    Smokeless tobacco: Not on file   Substance and Sexual Activity    Alcohol use: Never    Drug use: Never    Sexual activity: Not on file   Other Topics Concern    Not on file   Social History Narrative    Not on file     Social Determinants of Health     Financial Resource Strain: Not on file   Food Insecurity: Not on file   Transportation Needs: Not on file   Physical Activity: Not on file   Stress: Not on file   Social Connections: Not on file   Interpersonal Safety: Not At Risk (5/30/2024)    [facility] and [facility]    [facility] Custom IPV     Do you feel UNSAFE in any of your personal relationships with your family members or any other acquaintances?: No   Housing Stability: Not on file       Allergies:  No Known Allergies    Medications:  Current Outpatient Medications   Medication Sig Dispense Refill    aspirin (ASA) 81 MG chewable tablet Take 1 tablet by mouth.      B Complex-C-Zn-Folic Acid (DIALYVITE/ZINC) TABS Take 1 tablet by mouth      cinacalcet (SENSIPAR) 30 MG tablet Take 1 tablet by mouth daily at 2 pm      gentamicin (GARAMYCIN) 0.1 % external cream Apply topically.      metoprolol tartrate (LOPRESSOR) 50 MG tablet Take 50 mg by mouth 2 times daily.      potassium chloride ER (MICRO-K) 10 MEQ CR capsule Take by mouth.      sevelamer carbonate (RENVELA) 800 MG tablet TAKE 4 TABLETS BY MOUTH THREE TIMES DAILY WITH MEALS       No current facility-administered medications for this visit.       Vitals:    /90 (Abnormal)     Pulse 100   SpO2 98 %   Weight 92.1 kg (203 lb)   Height 1.79 m (5' 10.47\")   BMI " (Calculated) 28.74     Exam:  GENERAL APPEARANCE: alert and no distress  HENT: mouth without ulcers or lesions  RESP: lungs clear to auscultation - no rales, rhonchi or wheezes  CV: regular rhythm, normal rate, no rub, no murmur  EDEMA: no LE edema bilaterally  ABDOMEN: soft, nondistended, nontender, bowel sounds normal. Mild abdominal tissue edema  MS: extremities normal - no gross deformities noted, no evidence of inflammation in joints, no muscle tenderness  SKIN: no rash  DIALYSIS ACCESS:  Peritoneal dialysis catheter    Results:   Recent Results (from the past 336 hour(s))   EKG 12-lead, tracing only [EKG1]    Collection Time: 10/17/24  2:12 PM   Result Value Ref Range    Systolic Blood Pressure  mmHg    Diastolic Blood Pressure  mmHg    Ventricular Rate 80 BPM    Atrial Rate 80 BPM    TX Interval 186 ms    QRS Duration 96 ms     ms    QTc 479 ms    P Axis 10 degrees    R AXIS 86 degrees    T Axis 28 degrees    Interpretation ECG       Sinus rhythm  Possible Inferior infarct , age undetermined  Abnormal ECG  No previous ECGs available  Confirmed by MD TERE, TRAM ([id number]) on 10/19/2024 11:21:17 PM     Echocardiogram Complete    Collection Time: 10/17/24  2:20 PM   Result Value Ref Range    LVEF  55-60%    UA with Microscopic reflex to Culture    Collection Time: 10/17/24  2:39 PM    Specimen: Urine, NOS   Result Value Ref Range    Color Urine Dark Brown (A) Colorless, Straw, Light Yellow, Yellow    Appearance Urine Cloudy (A) Clear    Glucose Urine Negative Negative mg/dL    Bilirubin Urine Negative Negative    Ketones Urine Negative Negative mg/dL    Specific Gravity Urine 1.012 1.003 - 1.035    Blood Urine Large (A) Negative    pH Urine 7.5 (H) 5.0 - 7.0    Protein Albumin Urine 200 (A) Negative mg/dL    Urobilinogen Urine Normal Normal, 2.0 mg/dL    Nitrite Urine Negative Negative    Leukocyte Esterase Urine Moderate (A) Negative    Amorphous Crystals Urine Few (A) None Seen /HPF    RBC Urine >182 (H)  <=2 /HPF    WBC Urine 38 (H) <=5 /HPF    Squamous Epithelials Urine 2 (H) <=1 /HPF   Urine Culture    Collection Time: 10/17/24  2:39 PM    Specimen: Urine, NOS   Result Value Ref Range    Culture <10,000 CFU/mL Mixture of Urogenital Connie    Varicella Zoster Virus Antibody IgG [[id number]]    Collection Time: 10/17/24  2:40 PM   Result Value Ref Range    Varicella Zoster Virus Antibody IgG Interpretation Positive     Varicella Zoster Virus Antibody IgG Instrument Value 3.17 <1.00 S/CO   Treponema Abs w Reflex to RPR and Titer [[id number]]    Collection Time: 10/17/24  2:40 PM   Result Value Ref Range    Treponema Antibody Total Nonreactive Nonreactive   HIV Antigen Antibody Combo Pretransplant Cascade    Collection Time: 10/17/24  2:40 PM   Result Value Ref Range    HIV Antigen Antibody Combo Pretransplant Nonreactive Nonreactive   Hepatitis C antibody [[id number]]    Collection Time: 10/17/24  2:40 PM   Result Value Ref Range    Hepatitis C Antibody Nonreactive Nonreactive   Hepatitis B Surface Antibody [[id number]]    Collection Time: 10/17/24  2:40 PM   Result Value Ref Range    Hepatitis B Surface Antibody Reactive     Hepatitis B Surface Antibody Instrument Value 355.00 <8.5 m[IU]/mL   Hepatitis B surface antigen [[id number]]    Collection Time: 10/17/24  2:40 PM   Result Value Ref Range    Hepatitis B Surface Antigen Nonreactive Nonreactive   Hepatitis B core antibody [[id number]]    Collection Time: 10/17/24  2:40 PM   Result Value Ref Range    Hepatitis B Core Antibody Total Nonreactive Nonreactive   EBV Capsid Antibody IgG [[id number]]    Collection Time: 10/17/24  2:40 PM   Result Value Ref Range    EBV Capsid Lauren IgG Instrument Value <10.0 <18.0 U/mL    EBV Capsid Antibody IgG No detectable antibody. No detectable antibody.   CMV Antibody IgG [[id number]]    Collection Time: 10/17/24  2:40 PM   Result Value Ref Range    CMV Lauren IgG Instrument Value 3.20 (H) <0.60 U/mL    CMV Antibody IgG Positive, suggests recent or past  exposure. (A) No detectable antibody.    Thrombin time [[id number]]    Collection Time: 10/17/24  2:40 PM   Result Value Ref Range    Thrombin Time 17.8 13.0 - 19.0 Seconds   Partial thromboplastin time [LAB56]    Collection Time: 10/17/24  2:40 PM   Result Value Ref Range    aPTT 34 22 - 38 Seconds   Lupus Anticoagulant Panel [[id number]]    Collection Time: 10/17/24  2:40 PM   Result Value Ref Range    PTT Ratio 1.28 <1.30    DRVVT Screen Ratio 0.99 <1.08    Lupus Result Negative Negative    Lupus Interpretation       The INR is normal.  APTT ratio is normal.    DRVVT Screen ratio is normal.  Thrombin time is normal.  NEGATIVE TEST; A LUPUS ANTICOAGULANT WAS NOT DETECTED IN THIS SPECIMEN WITHIN THE LIMITS OF THE TESTING REPERTOIRE.  If the clinical picture is strongly suggestive of an antiphospholipid syndrome, recommend anticardiolipin and beta-2-glycoprotein (IgG and IgM) antibody tests.    Deana Dillard MD, PhD  [facility]      Signout Location if Remote Report signed out at: [email or web address]    INR [[id number]]    Collection Time: 10/17/24  2:40 PM   Result Value Ref Range    INR 1.10 0.85 - 1.15   Cardiolipin Lauren IgG and IgM [LAB 6836]    Collection Time: 10/17/24  2:40 PM   Result Value Ref Range    Cardiolipin Lauren IgG Instrument Value <2.0 <10.0 GPL-U/mL    Cardiolipin Antibody IgG Negative Negative    Cardiolipin Lauren IgM Instrument Value 2.5 <10.0 MPL-U/mL    Cardiolipin Antibody IgM Negative Negative   ABO and Rh    Collection Time: 10/17/24  2:40 PM   Result Value Ref Range    ABO/RH(D) AB POS     SPECIMEN EXPIRATION DATE [id number]    Comprehensive metabolic panel [LAB17]    Collection Time: 10/17/24  2:40 PM   Result Value Ref Range    Sodium 142 135 - 145 mmol/L    Potassium 3.0 (L) 3.4 - 5.3 mmol/L    Carbon Dioxide (CO2) 28 22 - 29 mmol/L    Anion Gap 19 (H) 7 - 15 mmol/L    Urea Nitrogen 17.5 6.0 - 20.0 mg/dL    Creatinine 12.80 (H) 0.67 - 1.17 mg/dL    GFR Estimate 5 (L) >60 mL/min/1.73m2    Calcium 8.7  (L) 8.8 - 10.4 mg/dL    Chloride 95 (L) 98 - 107 mmol/L    Glucose 98 70 - 99 mg/dL    Alkaline Phosphatase 175 (H) 40 - 150 U/L    AST 10 0 - 45 U/L    ALT 6 0 - 70 U/L    Protein Total 8.0 6.4 - 8.3 g/dL    Albumin 4.3 3.5 - 5.2 g/dL    Bilirubin Total 0.3 <=1.2 mg/dL   Quantiferon TB Gold Plus Grey Tube    Collection Time: 10/17/24  2:40 PM    Specimen: Arm, Right; Blood   Result Value Ref Range    Quantiferon Nil Tube 0.03 IU/mL   Quantiferon TB Gold Plus Green Tube    Collection Time: 10/17/24  2:40 PM    Specimen: Arm, Right; Blood   Result Value Ref Range    Quantiferon TB1 Tube 0.02 IU/mL   Quantiferon TB Gold Plus Yellow Tube    Collection Time: 10/17/24  2:40 PM    Specimen: Arm, Right; Blood   Result Value Ref Range    Quantiferon TB2 Tube 0.04    Quantiferon TB Gold Plus Purple Tube    Collection Time: 10/17/24  2:40 PM    Specimen: Arm, Right; Blood   Result Value Ref Range    Quantiferon Mitogen 10.00 IU/mL   CBC with platelets and differential    Collection Time: 10/17/24  2:40 PM   Result Value Ref Range    WBC Count 13.2 (H) 4.0 - 11.0 10e3/uL    RBC Count 5.72 4.40 - 5.90 10e6/uL    Hemoglobin 14.4 13.3 - 17.7 g/dL    Hematocrit 47.8 40.0 - 53.0 %    MCV 84 78 - 100 fL    MCH 25.2 (L) 26.5 - 33.0 pg    MCHC 30.1 (L) 31.5 - 36.5 g/dL    RDW 14.6 10.0 - 15.0 %    Platelet Count 486 (H) 150 - 450 10e3/uL    % Neutrophils 76 %    % Lymphocytes 15 %    % Monocytes 7 %    % Eosinophils 1 %    % Basophils 1 %    % Immature Granulocytes 0 %    NRBCs per 100 WBC 0 <1 /100    Absolute Neutrophils 10.0 (H) 1.6 - 8.3 10e3/uL    Absolute Lymphocytes 2.0 0.8 - 5.3 10e3/uL    Absolute Monocytes 0.9 0.0 - 1.3 10e3/uL    Absolute Eosinophils 0.1 0.0 - 0.7 10e3/uL    Absolute Basophils 0.2 0.0 - 0.2 10e3/uL    Absolute Immature Granulocytes 0.1 <=0.4 10e3/uL    Absolute NRBCs 0.0 10e3/uL   Extra Purple Top Tube    Collection Time: 10/17/24  2:40 PM   Result Value Ref Range    Hold Specimen JIC    Quantiferon TB Gold  Plus    Collection Time: 10/17/24  2:40 PM    Specimen: Arm, Right; Blood   Result Value Ref Range    Quantiferon-TB Gold Plus Negative Negative    TB1 Ag minus Nil Value -0.01 IU/mL    TB2 Ag minus Nil Value 0.01 IU/mL    Mitogen minus Nil Result 9.97 IU/mL    Nil Result 0.03 IU/mL   Blood Group A Subtype    Collection Time: 10/17/24  2:53 PM   Result Value Ref Range    A1 Antigen Type Negative     SPECIMEN EXPIRATION DATE [id number]    Adult Type and Screen    Collection Time: 10/17/24  2:53 PM   Result Value Ref Range    ABO/RH(D) AB POS     Antibody Screen Negative Negative    SPECIMEN EXPIRATION DATE [id number]          I spent a total of 60 minutes on the date of the encounter doing chart review, performing a history and physical exam, completing documentation and any further activities as noted above.

## 2024-10-18 LAB
BACTERIA UR CULT: NORMAL
CARDIOLIPIN IGG SER IA-ACNC: <2 GPL-U/ML
CARDIOLIPIN IGG SER IA-ACNC: NEGATIVE
CARDIOLIPIN IGM SER IA-ACNC: 2.5 MPL-U/ML
CARDIOLIPIN IGM SER IA-ACNC: NEGATIVE
CMV IGG SERPL IA-ACNC: 3.2 U/ML
CMV IGG SERPL IA-ACNC: ABNORMAL
DRVVT SCREEN RATIO: 0.99
EBV VCA IGG SER IA-ACNC: <10 U/ML
EBV VCA IGG SER IA-ACNC: NORMAL
GAMMA INTERFERON BACKGROUND BLD IA-ACNC: 0.03 IU/ML
LA PPP-IMP: NEGATIVE
LOCATION OF TASK: NORMAL
LUPUS INTERPRETATION: NORMAL
M TB IFN-G BLD-IMP: NEGATIVE
M TB IFN-G CD4+ BCKGRND COR BLD-ACNC: 9.97 IU/ML
MITOGEN IGNF BCKGRD COR BLD-ACNC: -0.01 IU/ML
MITOGEN IGNF BCKGRD COR BLD-ACNC: 0.01 IU/ML
PTT RATIO: 1.28
QUANTIFERON MITOGEN: 10 IU/ML
QUANTIFERON NIL TUBE: 0.03 IU/ML
QUANTIFERON TB1 TUBE: 0.02 IU/ML
QUANTIFERON TB2 TUBE: 0.04
THROMBIN TIME: 17.8 SECONDS (ref 13–19)
VZV IGG SER QL IA: 3.17 S/CO
VZV IGG SER QL IA: POSITIVE

## 2024-10-19 LAB
ATRIAL RATE - MUSE: 80 BPM
DIASTOLIC BLOOD PRESSURE - MUSE: NORMAL MMHG
INTERPRETATION ECG - MUSE: NORMAL
P AXIS - MUSE: 10 DEGREES
PR INTERVAL - MUSE: 186 MS
QRS DURATION - MUSE: 96 MS
QT - MUSE: 416 MS
QTC - MUSE: 479 MS
R AXIS - MUSE: 86 DEGREES
SYSTOLIC BLOOD PRESSURE - MUSE: NORMAL MMHG
T AXIS - MUSE: 28 DEGREES
VENTRICULAR RATE- MUSE: 80 BPM

## 2024-10-22 LAB
A*: NORMAL
A*LOCUS SEROLOGIC EQUIVALENT: 1
A*LOCUS: NORMAL
A*SEROLOGIC EQUIVALENT: 2
ABTEST METHOD: NORMAL
B*: NORMAL
B*LOCUS SEROLOGIC EQUIVALENT: 62
B*LOCUS: NORMAL
B*SEROLOGIC EQUIVALENT: 53
BW-1: NORMAL
BW-2: NORMAL
C*: NORMAL
C*LOCUS SEROLOGIC EQUIVALENT: 9
C*LOCUS: NORMAL
C*SEROLOGIC EQUIVALENT: 4
DPA1*: NORMAL
DPA1*LOCUS: NORMAL
DPB1*: NORMAL
DPB1*LOCUS: NORMAL
DQA1*LOCUS: NORMAL
DQB1*LOCUS SEROLOGIC EQUIVALENT: 6
DQB1*LOCUS: NORMAL
DRB1*LOCUS SEROLOGIC EQUIVALENT: 13
DRB1*LOCUS: NORMAL
DRB3*: NORMAL
DRB3*LOCUS NMDP: NORMAL
DRB3*LOCUS SEROLOGIC EQUIVALENT: 52
DRB3*LOCUS: NORMAL
DRB3*NMDP: NORMAL
DRB3*SEROLOGIC EQUIVALENT: 52
DRSSO TEST METHOD: NORMAL

## 2024-10-23 ENCOUNTER — COMMITTEE REVIEW (OUTPATIENT)
Dept: TRANSPLANT | Facility: CLINIC | Age: 24
End: 2024-10-23
Payer: COMMERCIAL

## 2024-10-23 NOTE — COMMITTEE REVIEW
Kidney/Pancreas Committee Review Note     Evaluation Date: 10/17/2024  Committee Review Date: 10/23/2024    Organ being evaluated for: Kidney    Transplant Phase: Evaluation  Transplant Status: Active    Transplant Coordinator: Sachi Villa  Transplant Surgeon:        Referring Physician: Higinio Lewis    Primary Diagnosis:   Secondary Diagnosis:     Committee Review Members:  Nephrology Devora Monge, NP, Ana Cristina Whittaker MD, Anish Esparza, APRN CNP, Saroj Frankel MD   Nutrition Muona Alberto, RD   Pharmacist Shagufta Lopez, [facility]    - Clinical Nelia Lee, MSW, Eric Andres, MSW, Tierra Danielson, [facility]   Transplant MARSHAL PATEL, RN, Ivanna Stokes, RN, Ayla Moran, TOAN, Susan Diaz, APRN CNP, Stacie Soriano, TOAN, Yeni Snell, RN, iLnda Simons, NP, Linda Zamora, RN, Elli Waddell, RN, Rachel Almanzar, RN, Bindu Alonzo, RN   Transplant Surgery Mane Junior MD       Transplant Eligibility: Irreversible chronic kidney disease treated w/dialysis or expected need for dialysis    Committee Review Decision: Needs Re-presentation    Relative Contraindications:     Absolute Contraindications:      Committee Chair Mane Junior MD verbally attested to the committee's decision.    Committee Discussion Details: Reviewed patient's medical status and evaluation results to date with multidisciplinary committee. Committee determined that patient is a fair candidate for kidney transplant, living or . Patient should have live donors register now to initiate donor evaluation.    The committee has recommended the following evaluation items be completed prior to being listed as active status on the kidney transplant wait list:    Social support and establish guardianship prior to transplant. Will need to follow up with social work.  Cardiology clearance - cardiac risk assessment.  Hematology clearance due to Polycythemia due to PCKD   Brain MRA due to  polycystic kidney disease.  Repeat UA due to RBCs and leukocyte esterase.  Per Dr. Hale, current imaging determined there was space for a kidney. If patient is not transplanted in 1 year, should update CT abdomen/pelvis w/o contrast.  Ensure health maintenance items are up to date.     Patient is not a candidate for A2B: ABO - AB    The patient will not be placed on the kidney wait list since they are on dialysis. The patient will be called and updated on the committee's decision, orders will be placed (if applicable), and summary letter will be sent.

## 2024-10-24 ENCOUNTER — PATIENT OUTREACH (OUTPATIENT)
Dept: ONCOLOGY | Facility: CLINIC | Age: 24
End: 2024-10-24
Payer: COMMERCIAL

## 2024-10-24 ENCOUNTER — TELEPHONE (OUTPATIENT)
Dept: TRANSPLANT | Facility: CLINIC | Age: 24
End: 2024-10-24
Payer: COMMERCIAL

## 2024-10-24 DIAGNOSIS — N18.6 ESRD (END STAGE RENAL DISEASE) (H): Primary | ICD-10-CM

## 2024-10-24 DIAGNOSIS — I10 HYPERTENSION: ICD-10-CM

## 2024-10-24 DIAGNOSIS — Z01.810 PRE-OPERATIVE CARDIOVASCULAR EXAMINATION: ICD-10-CM

## 2024-10-24 DIAGNOSIS — Z76.82 ORGAN TRANSPLANT CANDIDATE: ICD-10-CM

## 2024-10-24 NOTE — TELEPHONE ENCOUNTER
Called Daniela to discuss the outcome of the Selection Committee from 10/23/2024. Robert will need the following items to complete his evaluation . He is on dialysis so will not be listed until his evaluation is complete. He needs to see cardiology for his HFpEF, hematology for his polycythemia, brain MRA and repeat UA. Mom would prefer to get this all scheduled here. She will work on the guardianship for her son, schedule his dental and get the pneumococcal vaccination through his PCP. Virtual MTP class is scheduled for 11/7/2024 and the link for the videos was sent via email. She is aware if Robert is not transplanted within a year he will need an updated CT a/p. Transplant summary letter sent. Orders routed to scheduling.

## 2024-10-24 NOTE — PROGRESS NOTES
Hematology referral reviewed for [facility], see below.    Referral reason: polcythemia in setting of K/P transplant consideration for h/o PCKD, established with Oncology locally who felt therapeutic phlebotomy was an appropriate next step in managing Hgb levels, a result of which is likely from his PCKD    Clinical question entered by referring provider or through order transcription: polycythemia in PCKD     Referral received via: Internal referral by [facility]    Current abnormal labs: Available in CareEverywhere and Available in Chart Review    Plan: Triage instructions updated and sent to NPS for completion.

## 2024-10-31 LAB
PROTOCOL CUTOFF: NORMAL
SA 1  COMMENTS: NORMAL
SA 1 CELL: NORMAL
SA 1 TEST METHOD: NORMAL
SA 2 CELL: NORMAL
SA 2 COMMENTS: NORMAL
SA 2 TEST METHOD: NORMAL
SA1 HI RISK ABY: NORMAL
SA1 MOD RISK ABY: NORMAL
SA2 HI RISK ABY: NORMAL
SA2 MOD RISK ABY: NORMAL
UNACCEPTABLE ANTIGENS: NORMAL
UNOS CPRA: 0

## 2024-11-13 ENCOUNTER — HOSPITAL ENCOUNTER (OUTPATIENT)
Facility: CLINIC | Age: 24
End: 2024-11-13
Payer: COMMERCIAL

## 2024-11-20 ENCOUNTER — DOCUMENTATION ONLY (OUTPATIENT)
Dept: OTHER | Facility: CLINIC | Age: 24
End: 2024-11-20
Payer: COMMERCIAL

## 2024-11-21 ENCOUNTER — TELEPHONE (OUTPATIENT)
Dept: TRANSPLANT | Facility: CLINIC | Age: 24
End: 2024-11-21
Payer: COMMERCIAL

## 2024-11-21 NOTE — TELEPHONE ENCOUNTER
Received message from pre anesthesia that they were unable to contact Robert about his preop and scheduled for MRI under [facility] for tomorrow. Called mom Daniela and she had forgotten about this appointment. She called the PAC clinic and was instructed to get a pre op H&P done and then his MRI will be rescheduled. Daniela will call me or Sachi once she has the pre op scheduled.

## 2024-12-17 ENCOUNTER — HOSPITAL ENCOUNTER (EMERGENCY)
Facility: HOSPITAL | Age: 24
Discharge: OTHER INSTITUTION | End: 2024-12-17
Attending: STUDENT IN AN ORGANIZED HEALTH CARE EDUCATION/TRAINING PROGRAM
Payer: COMMERCIAL

## 2024-12-17 VITALS
TEMPERATURE: 98.7 F | OXYGEN SATURATION: 98 % | RESPIRATION RATE: 18 BRPM | SYSTOLIC BLOOD PRESSURE: 129 MMHG | HEART RATE: 93 BPM | DIASTOLIC BLOOD PRESSURE: 94 MMHG

## 2024-12-17 DIAGNOSIS — R14.0 ABDOMINAL BLOATING: ICD-10-CM

## 2024-12-17 LAB
ALBUMIN SERPL BCG-MCNC: 3.8 G/DL (ref 3.5–5.2)
ALP SERPL-CCNC: 208 U/L (ref 40–150)
ALT SERPL W P-5'-P-CCNC: 9 U/L (ref 0–70)
ANION GAP SERPL CALCULATED.3IONS-SCNC: 17 MMOL/L (ref 7–15)
APPEARANCE FLD: CLEAR
AST SERPL W P-5'-P-CCNC: 8 U/L (ref 0–45)
BILIRUB DIRECT SERPL-MCNC: <0.2 MG/DL (ref 0–0.3)
BILIRUB SERPL-MCNC: 0.3 MG/DL
BUN SERPL-MCNC: 15.5 MG/DL (ref 6–20)
CALCIUM SERPL-MCNC: 8.8 MG/DL (ref 8.8–10.4)
CELL COUNT BODY FLUID SOURCE: NORMAL
CHLORIDE SERPL-SCNC: 97 MMOL/L (ref 98–107)
COLOR FLD: COLORLESS
CREAT SERPL-MCNC: 12.58 MG/DL (ref 0.67–1.17)
EGFRCR SERPLBLD CKD-EPI 2021: 5 ML/MIN/1.73M2
ERYTHROCYTE [DISTWIDTH] IN BLOOD BY AUTOMATED COUNT: 16.6 % (ref 10–15)
GLUCOSE SERPL-MCNC: 118 MG/DL (ref 70–99)
HCO3 SERPL-SCNC: 31 MMOL/L (ref 22–29)
HCT VFR BLD AUTO: 51.8 % (ref 40–53)
HGB BLD-MCNC: 15.5 G/DL (ref 13.3–17.7)
HOLD SPECIMEN: NORMAL
LIPASE SERPL-CCNC: 34 U/L (ref 13–60)
MCH RBC QN AUTO: 24 PG (ref 26.5–33)
MCHC RBC AUTO-ENTMCNC: 29.9 G/DL (ref 31.5–36.5)
MCV RBC AUTO: 80 FL (ref 78–100)
PLATELET # BLD AUTO: 438 10E3/UL (ref 150–450)
POTASSIUM SERPL-SCNC: 3.2 MMOL/L (ref 3.4–5.3)
PROT SERPL-MCNC: 7.7 G/DL (ref 6.4–8.3)
RBC # BLD AUTO: 6.45 10E6/UL (ref 4.4–5.9)
RBC # FLD: 12 /UL
SODIUM SERPL-SCNC: 145 MMOL/L (ref 135–145)
WBC # BLD AUTO: 14.3 10E3/UL (ref 4–11)
WBC # FLD AUTO: 0 /UL

## 2024-12-17 PROCEDURE — 82042 OTHER SOURCE ALBUMIN QUAN EA: CPT | Performed by: STUDENT IN AN ORGANIZED HEALTH CARE EDUCATION/TRAINING PROGRAM

## 2024-12-17 PROCEDURE — 89050 BODY FLUID CELL COUNT: CPT | Performed by: STUDENT IN AN ORGANIZED HEALTH CARE EDUCATION/TRAINING PROGRAM

## 2024-12-17 PROCEDURE — 36415 COLL VENOUS BLD VENIPUNCTURE: CPT | Performed by: STUDENT IN AN ORGANIZED HEALTH CARE EDUCATION/TRAINING PROGRAM

## 2024-12-17 PROCEDURE — 85018 HEMOGLOBIN: CPT | Performed by: STUDENT IN AN ORGANIZED HEALTH CARE EDUCATION/TRAINING PROGRAM

## 2024-12-17 PROCEDURE — 99285 EMERGENCY DEPT VISIT HI MDM: CPT

## 2024-12-17 PROCEDURE — 82248 BILIRUBIN DIRECT: CPT | Performed by: STUDENT IN AN ORGANIZED HEALTH CARE EDUCATION/TRAINING PROGRAM

## 2024-12-17 PROCEDURE — 99284 EMERGENCY DEPT VISIT MOD MDM: CPT | Performed by: STUDENT IN AN ORGANIZED HEALTH CARE EDUCATION/TRAINING PROGRAM

## 2024-12-17 PROCEDURE — 87205 SMEAR GRAM STAIN: CPT | Performed by: STUDENT IN AN ORGANIZED HEALTH CARE EDUCATION/TRAINING PROGRAM

## 2024-12-17 PROCEDURE — 84157 ASSAY OF PROTEIN OTHER: CPT | Performed by: STUDENT IN AN ORGANIZED HEALTH CARE EDUCATION/TRAINING PROGRAM

## 2024-12-17 PROCEDURE — 83690 ASSAY OF LIPASE: CPT | Performed by: STUDENT IN AN ORGANIZED HEALTH CARE EDUCATION/TRAINING PROGRAM

## 2024-12-17 ASSESSMENT — COLUMBIA-SUICIDE SEVERITY RATING SCALE - C-SSRS
2. HAVE YOU ACTUALLY HAD ANY THOUGHTS OF KILLING YOURSELF IN THE PAST MONTH?: NO
1. IN THE PAST MONTH, HAVE YOU WISHED YOU WERE DEAD OR WISHED YOU COULD GO TO SLEEP AND NOT WAKE UP?: NO
6. HAVE YOU EVER DONE ANYTHING, STARTED TO DO ANYTHING, OR PREPARED TO DO ANYTHING TO END YOUR LIFE?: NO

## 2024-12-17 ASSESSMENT — ACTIVITIES OF DAILY LIVING (ADL)
ADLS_ACUITY_SCORE: 41

## 2024-12-17 NOTE — ED NOTES
Patient brought in by mom as she states she feels like he's more bloated. She thinks she noticed it more yesterday around the belly button. He is mostly non verbal and does peritenial dialysis 5 times a day. Mom sates that the dialysis has been normal.

## 2024-12-17 NOTE — ED TRIAGE NOTES
"\"No abdominal pain, having abdominal swelling.  Has peritoneal catheter.  After peritoneal dialysis abdomen is still swollen,\" stated by Mother.  \"Patient has autism,\" stated by Mother.    .JAVI Reilly assessed patient in triage and determined patient not Urgent Care appropriate. Will be seen in Emergency Department.         "

## 2024-12-17 NOTE — ED PROVIDER NOTES
History     Chief Complaint   Patient presents with    Bloated     Swelling, has peritoneal catheter     HPI  Robert Alarcon is a 24 year old male with past medical history of polycystic kidney disease now with end-stage renal disease on peritoneal dialysis for around 2 years, autism and nonverbal state, presenting with possible abdominal bloating. She has noticed his abdomen looks bloated for the past 24 hours. Dialysate does not look cloudy or dark. Seems the same as normal. No fevers. Mom states he did have something in his abdomen, possibly an infection around 1.5 years ago, but she is not sure exactly. She believes he has been having normal bowel movements. No fevers. Eating and drinking normally. No difficulty breathing. Otherwise behaving at baseline.    Allergies:  No Known Allergies    Problem List:    Patient Active Problem List    Diagnosis Date Noted    Polycythemia 02/05/2024     Priority: Medium    Bacterial sepsis (H) 05/07/2023     Priority: Medium    Umbilical hernia 05/07/2023     Priority: Medium    Anemia in chronic kidney disease 03/13/2023     Priority: Medium    Allergy, unspecified, initial encounter 03/13/2023     Priority: Medium    Anaphylactic shock, unspecified, initial encounter 03/13/2023     Priority: Medium    Systemic inflammatory response syndrome (sirs) of non-infectious origin with acute organ dysfunction (H) 03/13/2023     Priority: Medium    ESRD (end stage renal disease) (H) 11/10/2022     Priority: Medium     Formatting of this note might be different from the original.  Added automatically from request for surgery [id number]      Dependence on renal dialysis (H) 08/09/2022     Priority: Medium    Polycystic kidney, adult type 08/09/2022     Priority: Medium    Polycystic renal disease 08/05/2022     Priority: Medium    Secondary hyperparathyroidism of renal origin (H) 08/05/2022     Priority: Medium    Iron deficiency anemia secondary to inadequate dietary iron intake  08/05/2022     Priority: Medium    Left ventricular dilation 08/05/2022     Priority: Medium    Abnormal weight loss 08/05/2022     Priority: Medium    Hypokalemia 08/05/2022     Priority: Medium    Anemia of chronic renal failure, unspecified CKD stage 08/05/2022     Priority: Medium    Acute renal failure superimposed on chronic kidney disease (H) 08/05/2022     Priority: Medium    Elevated blood protein 08/05/2022     Priority: Medium    Elevated lactic acid level 08/05/2022     Priority: Medium    Hyperglycemia, unspecified 08/05/2022     Priority: Medium    Active autistic disorder 08/08/2007     Priority: Medium     Formatting of this note might be different from the original.  IMO Update 10/11          Past Medical History:    Past Medical History:   Diagnosis Date    ESRD (end stage renal disease) on dialysis (H)     PCK (polycystic kidney disease)     Peritoneal dialysis catheter in place (H)        Past Surgical History:    Past Surgical History:   Procedure Laterality Date    UMBILICAL HERNIA REPAIR  05/2024       Family History:    Family History   Problem Relation Age of Onset    No Known Problems Mother     Polycystic Kidney Diease Father     Polycystic Kidney Diease Paternal Grandfather     Polycystic Kidney Diease Paternal Aunt        Social History:  Marital Status:  Single [1]  Social History     Tobacco Use    Smoking status: Never   Substance Use Topics    Alcohol use: Never    Drug use: Never        Medications:    aspirin (ASA) 81 MG chewable tablet  B Complex-C-Zn-Folic Acid (DIALYVITE/ZINC) TABS  cinacalcet (SENSIPAR) 30 MG tablet  gentamicin (GARAMYCIN) 0.1 % external cream  metoprolol tartrate (LOPRESSOR) 50 MG tablet  potassium chloride ER (MICRO-K) 10 MEQ CR capsule  sevelamer carbonate (RENVELA) 800 MG tablet          Review of Systems   Unable to perform ROS: Patient nonverbal       Physical Exam   BP: 137/95  Pulse: 108  Temp: 98.7  F (37.1  C)  Resp: 18  SpO2: 95 %      Physical  Exam  Vitals reviewed.   Constitutional:       General: He is not in acute distress.     Appearance: Normal appearance. He is not ill-appearing, toxic-appearing or diaphoretic.   HENT:      Head: Normocephalic.      Right Ear: External ear normal.      Left Ear: External ear normal.      Nose: Nose normal.      Mouth/Throat:      Mouth: Mucous membranes are moist.      Pharynx: Oropharynx is clear.   Eyes:      Extraocular Movements: Extraocular movements intact.      Pupils: Pupils are equal, round, and reactive to light.   Cardiovascular:      Rate and Rhythm: Normal rate and regular rhythm.      Pulses: Normal pulses.      Heart sounds: Normal heart sounds.   Pulmonary:      Effort: Pulmonary effort is normal.      Breath sounds: Normal breath sounds.   Abdominal:      Palpations: Abdomen is soft.      Tenderness: There is no abdominal tenderness. There is no right CVA tenderness, left CVA tenderness, guarding or rebound.      Hernia: No hernia is present.      Comments: Obese abdomen, but not distended.   Musculoskeletal:         General: Normal range of motion.      Cervical back: Normal range of motion.   Skin:     General: Skin is warm.      Capillary Refill: Capillary refill takes less than 2 seconds.   Neurological:      General: No focal deficit present.      Mental Status: He is alert and oriented to person, place, and time.   Psychiatric:         Mood and Affect: Mood normal.         ED Course     Work-up unremarkable. No acute findings and not consistent with SBP. Given leukocytosis, plan for transfer to [place] for CT.    ED Course as of 12/17/24 1832 Tue Dec 17, 2024   1744 Dr. Mejia at [place] will accept patient for transfer.     Procedures              Results for orders placed or performed during the hospital encounter of 12/17/24 (from the past 24 hours)   CBC with platelets   Result Value Ref Range    WBC Count 14.3 (H) 4.0 - 11.0 10e3/uL    RBC Count 6.45 (H) 4.40 - 5.90 10e6/uL     Hemoglobin 15.5 13.3 - 17.7 g/dL    Hematocrit 51.8 40.0 - 53.0 %    MCV 80 78 - 100 fL    MCH 24.0 (L) 26.5 - 33.0 pg    MCHC 29.9 (L) 31.5 - 36.5 g/dL    RDW 16.6 (H) 10.0 - 15.0 %    Platelet Count 438 150 - 450 10e3/uL   Basic metabolic panel   Result Value Ref Range    Sodium 145 135 - 145 mmol/L    Potassium 3.2 (L) 3.4 - 5.3 mmol/L    Chloride 97 (L) 98 - 107 mmol/L    Carbon Dioxide (CO2) 31 (H) 22 - 29 mmol/L    Anion Gap 17 (H) 7 - 15 mmol/L    Urea Nitrogen 15.5 6.0 - 20.0 mg/dL    Creatinine 12.58 (H) 0.67 - 1.17 mg/dL    GFR Estimate 5 (L) >60 mL/min/1.73m2    Calcium 8.8 8.8 - 10.4 mg/dL    Glucose 118 (H) 70 - 99 mg/dL   Hepatic panel   Result Value Ref Range    Protein Total 7.7 6.4 - 8.3 g/dL    Albumin 3.8 3.5 - 5.2 g/dL    Bilirubin Total 0.3 <=1.2 mg/dL    Alkaline Phosphatase 208 (H) 40 - 150 U/L    AST 8 0 - 45 U/L    ALT 9 0 - 70 U/L    Bilirubin Direct <0.20 0.00 - 0.30 mg/dL   Lipase   Result Value Ref Range    Lipase 34 13 - 60 U/L   Extra Tube    Narrative    The following orders were created for panel order Extra Tube.  Procedure                               Abnormality         Status                     ---------                               -----------         ------                     Extra Red Top Tube[[id number]]                               Final result                 Please view results for these tests on the individual orders.   Extra Red Top Tube   Result Value Ref Range    Hold Specimen JIC    Cell count with differential fluid    Narrative    The following orders were created for panel order Cell count with differential fluid.  Procedure                               Abnormality         Status                     ---------                               -----------         ------                     Cell Count Body Fluid[[id number]]                            Final result               Differential Body Fluid[[id number]]                                                        Please view results for these tests on the individual orders.   Ascites Fluid Aerobic Bacterial Culture Routine With Gram Stain    Specimen: Peritoneum; Ascites Fluid   Result Value Ref Range    Culture Culture in progress     Gram Stain Result No PMNs seen     Gram Stain Result No organisms seen    Cell Count Body Fluid   Result Value Ref Range    Color Colorless Colorless, Yellow    Clarity Clear Clear    RBC Count 12 /uL    Cell Count Fluid Source Abdomen     Total Nucleated Cells 0 /uL    Narrative    No reference ranges have been established.  This result  should be interpreted in the context of the patient's clinical condition and   compared to simultaneous measurement in the patient's blood.             Medications - No data to display    Assessments & Plan (with Medical Decision Making)     I have reviewed the nursing notes.    24-year-old male, nonverbal, presenting with potential abdominal bloating noted by his mother who is his primary caregiver.  He is on peritoneal dialysis.  Mom is concerned as he did previously have an infection around 1.5 years ago for which she was hospitalized.  His vitals are reassuring here, but we do not have access to his CT scanner.  Will plan on obtaining labs, check peritoneal dialysate fluid, and then pending this make a decision with mother about transfer vs watchful waiting if labs are more reassuring.    See ED Course.    I have reviewed the findings, diagnosis, plan and need for follow up with the patient.      Discharge Medication List as of 12/17/2024  6:25 PM          Final diagnoses:   Abdominal bloating       12/17/2024   [facility]       Tutu Fajardo MD  12/17/24 2096

## 2024-12-18 LAB
ALBUMIN BODY FLUID SOURCE: NORMAL
ALBUMIN FLD-MCNC: <0.2 G/DL
PROT FLD-MCNC: <0.2 G/DL
PROTEIN BODY FLUID SOURCE: NORMAL

## 2024-12-19 LAB
BACTERIA FLD CULT: NORMAL
GRAM STAIN RESULT: NORMAL
GRAM STAIN RESULT: NORMAL

## 2024-12-25 LAB
BACTERIA FLD CULT: NO GROWTH
GRAM STAIN RESULT: NORMAL
GRAM STAIN RESULT: NORMAL

## 2025-01-09 NOTE — TELEPHONE ENCOUNTER
RECORDS RECEIVED FROM:    DATE RECEIVED:    NOTES STATUS DETAILS   OFFICE NOTE from referring provider  Internal SOT   OFFICE NOTE from other cardiologists  Care Everywhere Dr. Ogden   RECORDS from hospital/ED N/A    MEDICATION LIST Internal    GENERAL CARDIO RECORDS   (ALL APPOINTMENT TYPES)     LABS (CBC,BMP,CMP, TSH) Internal    EKG (STRIPS & REPORTS) Internal 10-17-23.   MONITORS (STRIPS & REPORTS) N/A    ECHOS (IMAGES AND REPORTS) Internal 10-17-24   STRESS TESTS (IMAGES AND REPORTS) N/A    cMRI (IMAGES AND REPORTS) N/A    Cardiac cath (IMAGES AND REPORTS) N/A    CT/CTA (IMAGES AND REPORTS) Internal 5-7-23

## 2025-01-14 ENCOUNTER — HOSPITAL ENCOUNTER (EMERGENCY)
Facility: HOSPITAL | Age: 25
Discharge: HOME | End: 2025-01-14
Attending: PHYSICIAN ASSISTANT
Payer: COMMERCIAL

## 2025-01-14 ENCOUNTER — APPOINTMENT (OUTPATIENT)
Dept: CT IMAGING | Facility: HOSPITAL | Age: 25
End: 2025-01-14
Attending: PHYSICIAN ASSISTANT
Payer: COMMERCIAL

## 2025-01-14 VITALS
BODY MASS INDEX: 30.24 KG/M2 | SYSTOLIC BLOOD PRESSURE: 136 MMHG | DIASTOLIC BLOOD PRESSURE: 95 MMHG | WEIGHT: 213.6 LBS | TEMPERATURE: 98.8 F | HEART RATE: 99 BPM | OXYGEN SATURATION: 97 % | RESPIRATION RATE: 16 BRPM

## 2025-01-14 DIAGNOSIS — Z99.2 PERITONEAL DIALYSIS CATHETER IN PLACE: ICD-10-CM

## 2025-01-14 DIAGNOSIS — L03.311 ABDOMINAL WALL CELLULITIS: ICD-10-CM

## 2025-01-14 LAB
ANION GAP SERPL CALCULATED.3IONS-SCNC: 17 MMOL/L (ref 7–15)
BASOPHILS # BLD AUTO: 0.1 10E3/UL (ref 0–0.2)
BASOPHILS NFR BLD AUTO: 1 %
BUN SERPL-MCNC: 18.5 MG/DL (ref 6–20)
CALCIUM SERPL-MCNC: 9.2 MG/DL (ref 8.8–10.4)
CHLORIDE SERPL-SCNC: 95 MMOL/L (ref 98–107)
CREAT SERPL-MCNC: 12 MG/DL (ref 0.67–1.17)
CRP SERPL-MCNC: 11.78 MG/L
EGFRCR SERPLBLD CKD-EPI 2021: 6 ML/MIN/1.73M2
EOSINOPHIL # BLD AUTO: 0.1 10E3/UL (ref 0–0.7)
EOSINOPHIL NFR BLD AUTO: 1 %
ERYTHROCYTE [DISTWIDTH] IN BLOOD BY AUTOMATED COUNT: 17.2 % (ref 10–15)
GLUCOSE SERPL-MCNC: 112 MG/DL (ref 70–99)
HCO3 SERPL-SCNC: 29 MMOL/L (ref 22–29)
HCT VFR BLD AUTO: 51.6 % (ref 40–53)
HGB BLD-MCNC: 16.2 G/DL (ref 13.3–17.7)
HOLD SPECIMEN: NORMAL
IMM GRANULOCYTES # BLD: 0 10E3/UL
IMM GRANULOCYTES NFR BLD: 0 %
LYMPHOCYTES # BLD AUTO: 1.9 10E3/UL (ref 0.8–5.3)
LYMPHOCYTES NFR BLD AUTO: 17 %
MCH RBC QN AUTO: 24.8 PG (ref 26.5–33)
MCHC RBC AUTO-ENTMCNC: 31.4 G/DL (ref 31.5–36.5)
MCV RBC AUTO: 79 FL (ref 78–100)
MONOCYTES # BLD AUTO: 0.9 10E3/UL (ref 0–1.3)
MONOCYTES NFR BLD AUTO: 8 %
NEUTROPHILS # BLD AUTO: 8.1 10E3/UL (ref 1.6–8.3)
NEUTROPHILS NFR BLD AUTO: 73 %
NRBC # BLD AUTO: 0 10E3/UL
NRBC BLD AUTO-RTO: 0 /100
PLATELET # BLD AUTO: 418 10E3/UL (ref 150–450)
POTASSIUM SERPL-SCNC: 2.9 MMOL/L (ref 3.4–5.3)
RBC # BLD AUTO: 6.52 10E6/UL (ref 4.4–5.9)
SODIUM SERPL-SCNC: 141 MMOL/L (ref 135–145)
WBC # BLD AUTO: 11.2 10E3/UL (ref 4–11)

## 2025-01-14 PROCEDURE — 80048 BASIC METABOLIC PNL TOTAL CA: CPT | Performed by: PHYSICIAN ASSISTANT

## 2025-01-14 PROCEDURE — 85004 AUTOMATED DIFF WBC COUNT: CPT | Performed by: PHYSICIAN ASSISTANT

## 2025-01-14 PROCEDURE — 99284 EMERGENCY DEPT VISIT MOD MDM: CPT | Mod: 25

## 2025-01-14 PROCEDURE — 82310 ASSAY OF CALCIUM: CPT | Performed by: PHYSICIAN ASSISTANT

## 2025-01-14 PROCEDURE — 99284 EMERGENCY DEPT VISIT MOD MDM: CPT | Performed by: PHYSICIAN ASSISTANT

## 2025-01-14 PROCEDURE — 36415 COLL VENOUS BLD VENIPUNCTURE: CPT | Performed by: PHYSICIAN ASSISTANT

## 2025-01-14 PROCEDURE — 250N000013 HC RX MED GY IP 250 OP 250 PS 637: Performed by: PHYSICIAN ASSISTANT

## 2025-01-14 PROCEDURE — 82374 ASSAY BLOOD CARBON DIOXIDE: CPT | Performed by: PHYSICIAN ASSISTANT

## 2025-01-14 PROCEDURE — 74176 CT ABD & PELVIS W/O CONTRAST: CPT

## 2025-01-14 PROCEDURE — 85048 AUTOMATED LEUKOCYTE COUNT: CPT | Performed by: PHYSICIAN ASSISTANT

## 2025-01-14 PROCEDURE — 85018 HEMOGLOBIN: CPT | Performed by: PHYSICIAN ASSISTANT

## 2025-01-14 PROCEDURE — 86140 C-REACTIVE PROTEIN: CPT | Performed by: PHYSICIAN ASSISTANT

## 2025-01-14 RX ORDER — POTASSIUM CHLORIDE 1500 MG/1
20 TABLET, EXTENDED RELEASE ORAL ONCE
Status: COMPLETED | OUTPATIENT
Start: 2025-01-14 | End: 2025-01-14

## 2025-01-14 RX ADMIN — POTASSIUM CHLORIDE 20 MEQ: 1500 TABLET, EXTENDED RELEASE ORAL at 17:02

## 2025-01-14 ASSESSMENT — COLUMBIA-SUICIDE SEVERITY RATING SCALE - C-SSRS: IS THE PATIENT NOT ABLE TO COMPLETE C-SSRS: UNABLE TO VERBALIZE

## 2025-01-14 ASSESSMENT — ACTIVITIES OF DAILY LIVING (ADL)
ADLS_ACUITY_SCORE: 41
ADLS_ACUITY_SCORE: 41

## 2025-01-14 NOTE — ED TRIAGE NOTES
ERICKA CALDWELL  assessed patient in triage and determined patient not Urgent Care appropriate. Will be seen in Emergency Department, Pt presents accompanied by mom with c/o an area of cellulitis on his abdomen, Pt recently finished a course of Augmentin but cellulitis has returned.

## 2025-01-14 NOTE — ED NOTES
Does peritoneal dialysis 5 times a day.  Last one was at 1300. No issues, per mother.  Pitting edema to lower abdomen noticed by mother this morning.   No fevers. Was on a 10 day Augmentin regimen for ABD cellulitis; finished recently.

## 2025-01-14 NOTE — DISCHARGE INSTRUCTIONS
Being he had such good results from the Augmentin I will plan to repeat the antibiotic.  I did reach out to the on-call nephrologist at [facility] and he reported that it is very important that you follow-up with his a dedicated peritoneal dialysis nurse for a clinic evaluation.  Please return to this emergency department for any other questions or concerns.

## 2025-01-14 NOTE — ED PROVIDER NOTES
History     Chief Complaint   Patient presents with    Abdominal Pain     The history is provided by a parent.     Robert Alarcon is a 24 year old male who presented to the emergency department along with family for evaluation of some swelling in his abdomen.  The patient has a history that is most significant for end-stage renal disease secondary to polycystic kidney disease as well as virtually nonverbal state from autism.  He is currently on peritoneal dialysis 5 times a day.  Mother reports he had a very similar presentation in the middle of December and was transferred to [place] for CT scanning.  He was placed on Augmentin and had excellent results.  Symptoms were noticed today.  No fevers.  Otherwise acting appropriately.  Normal instillation and drainage from the dialysis port.  He has no abdominal pain.    Allergies:  No Known Allergies    Problem List:    Patient Active Problem List    Diagnosis Date Noted    Polycythemia 02/05/2024     Priority: Medium    Bacterial sepsis (H) 05/07/2023     Priority: Medium    Umbilical hernia 05/07/2023     Priority: Medium    Anemia in chronic kidney disease 03/13/2023     Priority: Medium    Allergy, unspecified, initial encounter 03/13/2023     Priority: Medium    Anaphylactic shock, unspecified, initial encounter 03/13/2023     Priority: Medium    Systemic inflammatory response syndrome (sirs) of non-infectious origin with acute organ dysfunction (H) 03/13/2023     Priority: Medium    ESRD (end stage renal disease) (H) 11/10/2022     Priority: Medium     Formatting of this note might be different from the original.  Added automatically from request for surgery [id number]      Dependence on renal dialysis 08/09/2022     Priority: Medium    Polycystic kidney, adult type 08/09/2022     Priority: Medium    Polycystic renal disease 08/05/2022     Priority: Medium    Secondary hyperparathyroidism of renal origin 08/05/2022     Priority: Medium    Iron deficiency anemia  secondary to inadequate dietary iron intake 08/05/2022     Priority: Medium    Left ventricular dilation 08/05/2022     Priority: Medium    Abnormal weight loss 08/05/2022     Priority: Medium    Hypokalemia 08/05/2022     Priority: Medium    Anemia of chronic renal failure, unspecified CKD stage 08/05/2022     Priority: Medium    Acute renal failure superimposed on chronic kidney disease 08/05/2022     Priority: Medium    Elevated blood protein 08/05/2022     Priority: Medium    Elevated lactic acid level 08/05/2022     Priority: Medium    Hyperglycemia, unspecified 08/05/2022     Priority: Medium    Active autistic disorder 08/08/2007     Priority: Medium     Formatting of this note might be different from the original.  IMO Update 10/11          Past Medical History:    Past Medical History:   Diagnosis Date    ESRD (end stage renal disease) on dialysis (H)     PCK (polycystic kidney disease)     Peritoneal dialysis catheter in place        Past Surgical History:    Past Surgical History:   Procedure Laterality Date    UMBILICAL HERNIA REPAIR  05/2024       Family History:    Family History   Problem Relation Age of Onset    No Known Problems Mother     Polycystic Kidney Diease Father     Polycystic Kidney Diease Paternal Grandfather     Polycystic Kidney Diease Paternal Aunt        Social History:  Marital Status:  Single [1]  Social History     Tobacco Use    Smoking status: Never   Substance Use Topics    Alcohol use: Never    Drug use: Never        Medications:    amoxicillin-clavulanate (AUGMENTIN) 875-125 MG tablet  aspirin (ASA) 81 MG chewable tablet  B Complex-C-Zn-Folic Acid (DIALYVITE/ZINC) TABS  cinacalcet (SENSIPAR) 30 MG tablet  gentamicin (GARAMYCIN) 0.1 % external cream  metoprolol tartrate (LOPRESSOR) 50 MG tablet  potassium chloride ER (MICRO-K) 10 MEQ CR capsule  sevelamer carbonate (RENVELA) 800 MG tablet          Review of Systems   Unable to perform ROS: Patient nonverbal       Physical Exam    BP: 136/95  Pulse: 99  Temp: 98.8  F (37.1  C)  Resp: 16  Weight: 96.9 kg (213 lb 9.6 oz)  SpO2: 97 %      Physical Exam  Vitals and nursing note reviewed.   Constitutional:       General: He is not in acute distress.     Appearance: Normal appearance. He is obese. He is not ill-appearing, toxic-appearing or diaphoretic.   Cardiovascular:      Rate and Rhythm: Normal rate and regular rhythm.   Pulmonary:      Effort: Pulmonary effort is normal.   Abdominal:      Palpations: Abdomen is soft.      Comments: No evidence of guarding or rebound or rigidity of the abdomen.  Just distal to the umbilicus and the peritoneal site there appears to be some mild to moderate edema and very minimal erythema.  There is no significant heat.  There is no evidence of concerning features at the belt line.  There is no evidence of erysipelas.  No crepitus.  No ulcer formation, petechiae, or purpura.  Does not seem to be consistent with SBP.   Skin:     General: Skin is warm and dry.      Capillary Refill: Capillary refill takes less than 2 seconds.   Neurological:      Mental Status: He is alert.         ED Course     ED Course as of 01/14/25 1728   Tue Jan 14, 2025   1603 Chronic leukocytosis   1702 Awaiting call from [facility]     Procedures              Critical Care time:  none              Results for orders placed or performed during the hospital encounter of 01/14/25 (from the past 24 hours)   CBC with platelets differential    Narrative    The following orders were created for panel order CBC with platelets differential.  Procedure                               Abnormality         Status                     ---------                               -----------         ------                     CBC with platelets and d...[[id number]]  Abnormal            Final result                 Please view results for these tests on the individual orders.   Basic metabolic panel   Result Value Ref Range    Sodium 141 135 - 145  mmol/L    Potassium 2.9 (L) 3.4 - 5.3 mmol/L    Chloride 95 (L) 98 - 107 mmol/L    Carbon Dioxide (CO2) 29 22 - 29 mmol/L    Anion Gap 17 (H) 7 - 15 mmol/L    Urea Nitrogen 18.5 6.0 - 20.0 mg/dL    Creatinine 12.00 (H) 0.67 - 1.17 mg/dL    GFR Estimate 6 (L) >60 mL/min/1.73m2    Calcium 9.2 8.8 - 10.4 mg/dL    Glucose 112 (H) 70 - 99 mg/dL   [place] Draw    Narrative    The following orders were created for panel order [place] Draw.  Procedure                               Abnormality         Status                     ---------                               -----------         ------                     Extra Blue Top Tube[[id number]]                              Final result               Extra Red Top Tube[[id number]]                               Final result               Extra Green Top (Lithium...[[id number]]                      Final result               Extra Heparinized Syringe[[id number]]                        Final result                 Please view results for these tests on the individual orders.   CBC with platelets and differential   Result Value Ref Range    WBC Count 11.2 (H) 4.0 - 11.0 10e3/uL    RBC Count 6.52 (H) 4.40 - 5.90 10e6/uL    Hemoglobin 16.2 13.3 - 17.7 g/dL    Hematocrit 51.6 40.0 - 53.0 %    MCV 79 78 - 100 fL    MCH 24.8 (L) 26.5 - 33.0 pg    MCHC 31.4 (L) 31.5 - 36.5 g/dL    RDW 17.2 (H) 10.0 - 15.0 %    Platelet Count 418 150 - 450 10e3/uL    % Neutrophils 73 %    % Lymphocytes 17 %    % Monocytes 8 %    % Eosinophils 1 %    % Basophils 1 %    % Immature Granulocytes 0 %    NRBCs per 100 WBC 0 <1 /100    Absolute Neutrophils 8.1 1.6 - 8.3 10e3/uL    Absolute Lymphocytes 1.9 0.8 - 5.3 10e3/uL    Absolute Monocytes 0.9 0.0 - 1.3 10e3/uL    Absolute Eosinophils 0.1 0.0 - 0.7 10e3/uL    Absolute Basophils 0.1 0.0 - 0.2 10e3/uL    Absolute Immature Granulocytes 0.0 <=0.4 10e3/uL    Absolute NRBCs 0.0 10e3/uL   Extra Blue Top Tube   Result Value Ref Range    Hold Specimen JIC    Extra Red  Top Tube   Result Value Ref Range    Hold Specimen JIC    Extra Green Top (Lithium Heparin) Tube   Result Value Ref Range    Hold Specimen JIC    Extra Heparinized Syringe   Result Value Ref Range    Hold Specimen JIC    CRP inflammation   Result Value Ref Range    CRP Inflammation 11.78 (H) <5.00 mg/L   CT Abdomen Pelvis w/o Contrast    Narrative    PROCEDURE:  CT ABDOMEN PELVIS W/O CONTRAST    HISTORY:  History of peritoneal dialysis and new lower abdominal edema  and erythema    TECHNIQUE:  Helical CT of the abdomen and pelvis was performed without  intravenous contrast. This CT exam was performed using one or more the  following dose reduction techniques: automated exposure control,  adjustment of the mA and/or kV according to patient size, and/or  iterative reconstruction technique.    COMPARISON:  5/7/2023    FINDINGS:      Evaluation of the solid organs is somewhat limited due to the lack of  intravenous contrast.    Limited views through the lung bases show no focal consolidation or  intrapulmonary mass.     Polycystic disease of the kidneys is redemonstrated. The bladder is  decompressed. A peritoneal dialysis catheter is looped in the pelvis.  Free fluid is seen in the abdomen. No significant free air is  identified. The bowel is normal in caliber.    Anterior abdominal subcutaneous edema and skin thickening, somewhat  centered on the dialysis catheter. Edema is not seen in the posterior  subcutaneous tissues or the proximal thighs or the chest.    Noncontrast technique limits assessment of the IVC and iliac veins.     The liver is normal in size and attenuation. The gallbladder is  unremarkable. The spleen, pancreas and adrenal glands are  unremarkable. There is no abdominal aortic aneurysm.      Mild diffuse bony sclerosis likely reflects secondary  hyperparathyroidism. No focal suspicious osseous lesions are  identified.      Impression    IMPRESSION:      Polycystic kidney disease.    Edema within the  anterior lower abdomen soft tissues, not involving  the chest, thighs or posterior tissues. Cellulitis or a fracture of  the dialysis catheter is possible. Respiratory motion limits  assessment of the catheter.    KARLENE PLASCENCIA MD         SYSTEM ID:  [id number]       Medications   potassium chloride kevon ER (KLOR-CON M20) CR tablet 20 mEq (20 mEq Oral $Given 1/14/25 3878)       Assessments & Plan (with Medical Decision Making)   24-year-old male with a history of peritoneal dialysis presents with concerns of a possible abdominal wall cellulitis.  Similar presentation 1 month ago.  Excellent results from Augmentin.  I did call and discussed his case with on-call nephrologist Dr. Lowry at [facility] in [place].  He advised starting oral antibiotics and have the patient follow-up with his a dedicated peritoneal dialysis nurse this week for recheck.  Mother reports a low risk for catheter fracture as it is functioning normally.  As stated above he has no evidence of SBP.  He is not tachycardic or febrile.  He has no significant leukocytosis.  His CRP is relatively low.  However we did discuss strict return precautions and I stressed close clinic follow-up.  She was quite happy and agreeable with the plan and had no further questions or concerns for me.    This document was prepared using a combination of typing and voice generated software.  While every attempt was made for accuracy, spelling and grammatical errors may exist.     I have reviewed the nursing notes.    I have reviewed the findings, diagnosis, plan and need for follow up with the patient.           Medical Decision Making  The patient's presentation was of moderate complexity (an undiagnosed new problem with uncertain prognosis).    The patient's evaluation involved:  ordering and/or review of 3+ test(s) in this encounter (multiple labs and CT scan)  discussion of management or test interpretation with another health professional ([facility] nephrology)    The patient's management necessitated moderate risk (prescription drug management including medications given in the ED).        New Prescriptions    AMOXICILLIN-CLAVULANATE (AUGMENTIN) 875-125 MG TABLET    Take 1 tablet by mouth 2 times daily for 10 days.       Final diagnoses:   Abdominal wall cellulitis   Peritoneal dialysis catheter in place       1/14/2025   [facility]       Shakeel Whitlock PA-C  01/14/25 1728       Shakeel Whitlock PA-C  01/14/25 1728

## 2025-02-03 ENCOUNTER — TELEPHONE (OUTPATIENT)
Dept: CARDIOLOGY | Facility: CLINIC | Age: 25
End: 2025-02-03
Payer: COMMERCIAL

## 2025-02-03 NOTE — TELEPHONE ENCOUNTER
Spoke with patient's mother to offer sooner appt on 2/5. Mom states that they will not be able to get to [place] on such short notice - they actually want to look into going somewhere closer to home. Advised to call back if they choose to do so. Mom states she may call scheduling line back at some point to push appointment out to give her time to look into other options for the patient closer to home.

## 2025-02-11 ENCOUNTER — PRE VISIT (OUTPATIENT)
Dept: CARDIOLOGY | Facility: CLINIC | Age: 25
End: 2025-02-11

## 2025-02-13 ENCOUNTER — TELEPHONE (OUTPATIENT)
Dept: TRANSPLANT | Facility: CLINIC | Age: 25
End: 2025-02-13
Payer: COMMERCIAL

## 2025-02-13 NOTE — TELEPHONE ENCOUNTER
Provider Call: General  Route to LPN    Reason for call: She will be helping pt and his Mom re appts and getting things done locally She will be seeing pt today and would like a call back to review pt before she sees them.     Call back needed? Yes    Return Call Needed  Same as documented in contacts section  When to return call?: Same day: Route High Priority

## 2025-08-19 ENCOUNTER — TELEPHONE (OUTPATIENT)
Dept: TRANSPLANT | Facility: CLINIC | Age: 25
End: 2025-08-19
Payer: COMMERCIAL

## 2025-08-19 DIAGNOSIS — Z01.810 PRE-OPERATIVE CARDIOVASCULAR EXAMINATION: ICD-10-CM

## 2025-08-19 DIAGNOSIS — N18.6 ESRD (END STAGE RENAL DISEASE) (H): Primary | ICD-10-CM

## 2025-08-19 DIAGNOSIS — Z76.82 ORGAN TRANSPLANT CANDIDATE: ICD-10-CM

## 2025-08-19 DIAGNOSIS — Z01.818 PRE-TRANSPLANT EVALUATION FOR KIDNEY TRANSPLANT: ICD-10-CM
